# Patient Record
Sex: MALE | Race: WHITE | Employment: OTHER | URBAN - METROPOLITAN AREA
[De-identification: names, ages, dates, MRNs, and addresses within clinical notes are randomized per-mention and may not be internally consistent; named-entity substitution may affect disease eponyms.]

---

## 2018-04-13 ENCOUNTER — HOSPITAL ENCOUNTER (OUTPATIENT)
Age: 73
Discharge: HOME | End: 2018-04-13
Payer: MEDICARE

## 2018-04-13 ENCOUNTER — HOSPITAL ENCOUNTER (OUTPATIENT)
Age: 73
Discharge: HOME | End: 2018-04-15
Payer: MEDICARE

## 2018-04-13 ENCOUNTER — HOSPITAL ENCOUNTER (OUTPATIENT)
Dept: GENERAL RADIOLOGY | Age: 73
Discharge: HOME | End: 2018-04-15
Payer: MEDICARE

## 2018-04-13 DIAGNOSIS — R07.0 BURNING SENSATION OF THROAT: ICD-10-CM

## 2018-04-13 LAB
EKG ATRIAL RATE: 80 BPM
EKG P AXIS: 49 DEGREES
EKG P-R INTERVAL: 202 MS
EKG Q-T INTERVAL: 358 MS
EKG QRS DURATION: 90 MS
EKG QTC CALCULATION (BAZETT): 412 MS
EKG R AXIS: 42 DEGREES
EKG T AXIS: 46 DEGREES
EKG VENTRICULAR RATE: 80 BPM

## 2018-04-13 PROCEDURE — 71046 X-RAY EXAM CHEST 2 VIEWS: CPT

## 2018-04-13 PROCEDURE — 93005 ELECTROCARDIOGRAM TRACING: CPT | Performed by: FAMILY MEDICINE

## 2020-01-27 ENCOUNTER — HOSPITAL ENCOUNTER (OUTPATIENT)
Age: 75
Discharge: HOME | End: 2020-01-29
Payer: MEDICARE

## 2020-01-27 ENCOUNTER — HOSPITAL ENCOUNTER (OUTPATIENT)
Dept: GENERAL RADIOLOGY | Age: 75
Discharge: HOME | End: 2020-01-29
Payer: MEDICARE

## 2020-01-27 PROCEDURE — 74018 RADEX ABDOMEN 1 VIEW: CPT

## 2020-03-06 ENCOUNTER — OFFICE VISIT (OUTPATIENT)
Dept: SURGERY | Age: 75
End: 2020-03-06
Payer: MEDICARE

## 2020-03-06 VITALS
DIASTOLIC BLOOD PRESSURE: 64 MMHG | HEIGHT: 64 IN | BODY MASS INDEX: 31.07 KG/M2 | HEART RATE: 98 BPM | WEIGHT: 182 LBS | TEMPERATURE: 97.9 F | SYSTOLIC BLOOD PRESSURE: 125 MMHG

## 2020-03-06 PROCEDURE — G8417 CALC BMI ABV UP PARAM F/U: HCPCS | Performed by: SURGERY

## 2020-03-06 PROCEDURE — G8427 DOCREV CUR MEDS BY ELIG CLIN: HCPCS | Performed by: SURGERY

## 2020-03-06 PROCEDURE — 99204 OFFICE O/P NEW MOD 45 MIN: CPT | Performed by: SURGERY

## 2020-03-06 PROCEDURE — G8484 FLU IMMUNIZE NO ADMIN: HCPCS | Performed by: SURGERY

## 2020-03-06 RX ORDER — ALFUZOSIN HYDROCHLORIDE 10 MG/1
10 TABLET, EXTENDED RELEASE ORAL NIGHTLY
COMMUNITY
Start: 2020-02-10

## 2020-03-06 NOTE — PROGRESS NOTES
DISKUS) 100-50 MCG/DOSE diskus inhaler Inhale 1 puff into the lungs every 12 hours Yes Chung July, DO   ADVAIR DISKUS 100-50 MCG/DOSE diskus inhaler Inhale 1 puff into the lungs every 12 hours Yes Chung July, DO   rosuvastatin (CRESTOR) 5 MG tablet Take 5 mg by mouth daily Yes Historical Provider, MD   famotidine (PEPCID) 20 MG tablet Take 20 mg by mouth 2 times daily Yes Historical Provider, MD   finasteride (PROSCAR) 5 MG tablet Take 5 mg by mouth daily Yes Historical Provider, MD Matt Arrington Serenoa repens, 1000 MG CAPS Take 1,200 mg by mouth daily Yes Historical Provider, MD   albuterol sulfate HFA (PROAIR HFA) 108 (90 BASE) MCG/ACT inhaler Inhale 2 puffs into the lungs every 6 hours as needed for Wheezing Yes Archibald July, DO   losartan (COZAAR) 50 MG tablet Take 1 tablet by mouth daily. Yes Historical Provider, MD   sodium chloride (OCEAN, BABY AYR) 0.65 % nasal spray by Nasal route. Daily to rinse nasal passages Yes Historical Provider, MD   Multiple Vitamins-Minerals (MULTIVITAMIN PO) Take 1 tablet by mouth daily. Yes Historical Provider, MD   Ascorbic Acid (VITAMIN C PO) Take 500 mg by mouth. Yes Historical Provider, MD   Flax OIL Take 1,000 mg by mouth daily. Yes Historical Provider, MD   vitamin D (CHOLECALCIFEROL) 1000 UNIT TABS tablet Take 1,000 Units by mouth daily. Yes Historical Provider, MD   acetaminophen (TYLENOL) 500 MG tablet Take 500 mg by mouth every 6 hours as needed. Yes Historical Provider, MD   dextromethorphan (DELSYM) 30 MG/5ML liquid Take 60 mg by mouth 2 times daily as needed. Yes Historical Provider, MD   bismuth subsalicylate (PEPTO BISMOL) 262 MG chewable tablet Take  by mouth daily as needed. Yes Historical Provider, MD   amoxicillin (AMOXIL) 500 MG capsule Take 500 mg by mouth. Pre dental work take 4 pills 1 hour before appointment Yes Historical Provider, MD   levothyroxine (SYNTHROID) 50 MCG tablet Take 50 mcg by mouth daily.    Yes Historical Provider, MD   calcium

## 2020-04-03 ENCOUNTER — VIRTUAL VISIT (OUTPATIENT)
Dept: SURGERY | Age: 75
End: 2020-04-03
Payer: MEDICARE

## 2020-04-03 PROCEDURE — 99442 PR PHYS/QHP TELEPHONE EVALUATION 11-20 MIN: CPT | Performed by: SURGERY

## 2020-04-03 NOTE — PROGRESS NOTES
Maribel Singh Read  4/3/2020      Office phone visit  15 minutes    Sarah Angeles is a 69 y.o.  male with chronic diarrhea and constipation. Dr. Ismael Lowery did EGD and Colonoscopy. Treated with Flagyl and the diarrhea resolved for several weeks then came back. Using Probiotics. Used [place] daily to make the bowels move. Tried Miralax which caused too much diarrhea. Uses Pepto Bismol to slow down the diarrhea. Used Fiber gummy's which worked well unless he does too much and he gets gas and bloating. Bowels moving once a day but he still gets some diarrhea then constipation if he is not careful. Past Medical History: He has a past medical history of Acid reflux, Asthma, Blood transfusion, Cancer ([facility] [address] Cataract, Glaucoma, H/O splenectomy, Hyperlipidemia, Hypertension, IBS (irritable bowel syndrome), Myxoma of heart, Nausea & vomiting, Obesity, Rhinitis, Shingles, and Thyroid disease. Past Surgical History: He has a past surgical history that includes Inguinal hernia repair; Tonsillectomy; Appendectomy; Cholecystectomy (2001); Upper gastrointestinal endoscopy (several); Colonoscopy; eye surgery; Cardiac surgery (1996); skin biopsy; Abdomen surgery (2009); Abdomen surgery (1984); [facility] tooth extraction; Dental surgery; Hernia repair (11-); Splenectomy (1988); Hip fracture surgery (10/2012); ECHO Compl W Dop Color Flow (12/13/2012); Cholecystectomy; Colon surgery; Abdomen surgery (11/2011); and Abdominal adhesion surgery (12/01/2012). Home Medications  Prior to Visit Medications    Medication Sig Taking?  Authorizing Provider   ADVAIR DISKUS 100-50 MCG/DOSE diskus inhaler Inhale 1 puff into the lungs every 12 hours  Major Leslie DO   alfuzosin (UROXATRAL) 10 MG extended release tablet   Historical Provider, MD   montelukast (SINGULAIR) 10 MG tablet Take 1 tablet by mouth nightly  Major Leslie DO   fluticasone (FLONASE) 50 MCG/ACT nasal spray 1 spray by Nasal route daily  Leslie Hathaway DO   azelastine (ASTELIN) 0.1 % nasal spray 1 inhalation per nostril once daily  Major Leslie,    Amantadine (SYMMETREL) 100 MG TABS tablet 1 tab daily x 5 days cold/flu sx Dec-May  Major Leslie DO   beclomethasone (QVAR) 80 MCG/ACT inhaler Inhale 1 puff into the lungs 2 times daily Use for flare ups x 4-6 weeks  Major Leslie DO   ADVAIR DISKUS 100-50 MCG/DOSE diskus inhaler Inhale 1 puff into the lungs every 12 hours  Major Leslie DO   rosuvastatin (CRESTOR) 5 MG tablet Take 5 mg by mouth daily  Historical Provider, MD   famotidine (PEPCID) 20 MG tablet Take 20 mg by mouth 2 times daily  Historical Provider, MD   finasteride (PROSCAR) 5 MG tablet Take 5 mg by mouth daily  Historical Provider, MD   Saw [place], Serenoa repens, 1000 MG CAPS Take 1,200 mg by mouth daily  Historical Provider, MD   albuterol sulfate HFA (PROAIR HFA) 108 (90 BASE) MCG/ACT inhaler Inhale 2 puffs into the lungs every 6 hours as needed for Wheezing  Major Leslie DO   losartan (COZAAR) 50 MG tablet Take 1 tablet by mouth daily. Historical Provider, MD   sodium chloride (OCEAN, BABY AYR) 0.65 % nasal spray by Nasal route. Daily to rinse nasal passages  Historical Provider, MD   Multiple Vitamins-Minerals (MULTIVITAMIN PO) Take 1 tablet by mouth daily. Historical Provider, MD   Ascorbic Acid (VITAMIN C PO) Take 500 mg by mouth. Historical Provider, MD   Flax OIL Take 1,000 mg by mouth daily. Historical Provider, MD   vitamin D (CHOLECALCIFEROL) 1000 UNIT TABS tablet Take 1,000 Units by mouth daily. Historical Provider, MD   acetaminophen (TYLENOL) 500 MG tablet Take 500 mg by mouth every 6 hours as needed. Historical Provider, MD   dextromethorphan (DELSYM) 30 MG/5ML liquid Take 60 mg by mouth 2 times daily as needed. Historical Provider, MD   bismuth subsalicylate (PEPTO BISMOL) 262 MG chewable tablet Take  by mouth daily as needed. Historical Provider, MD   amoxicillin (AMOXIL) 500 MG capsule Take 500 mg by mouth.  Pre dental work take 4 pills 1 hour before appointment  Historical Provider, MD   levothyroxine (SYNTHROID) 50 MCG tablet Take 50 mcg by mouth daily. Historical Provider, MD   calcium carbonate (TUMS) 500 MG chewable tablet Take 1 tablet by mouth daily. 750 mg tums 1 or 2 after meals as needed   Historical Provider, MD   aspirin 81 MG tablet Take 81 mg by mouth daily. Historical Provider, MD       Allergies: Flomax [tamsulosin hcl]; Adhesive tape; Alphagan [brimonidine tartrate]; Brimonidine tartrate; Cortisone; Cosopt [dorzolamide hcl-timolol mal]; Cosopt [dorzolamide hcl-timolol mal]; Gluten; Mold extract [trichophyton mentagrophytes]; Other; Timolol; Timolol maleate; and Cortisone   Social History:   TOBACCO:   reports that he has never smoked. He has never used smokeless tobacco.  All smokers should join the free smoking cessation program and stop smoking before having surgery. ETOH:    reports current alcohol use. Problem Relation Age of Onset    Dementia Mother             Other Father         /broken heart after wife        Review of Systems:  Psychiatric:  depression and anxiety  Respiratory: negative  Cardiovascular: negative  Gastrointestinal: negative  Musculoskeletal:negative  All others reviewed, negative    Physical Exam:   VITALS: There were no vitals taken for this visit. ASSESSMENT: diarrhea and constipation will be a lifelong problem but controllable with fiber. PLAN: continue using Fiber Gummy's 2-6 per day with lots of fluids to make sure bowels move once a day, nice and soft. Ok to Topmission as needed. Signed: Dr. De Murdock M.D.     Send copy of consult to PCP, Eneida Ochoa DO

## 2021-02-26 ENCOUNTER — IMMUNIZATION (OUTPATIENT)
Dept: PRIMARY CARE CLINIC | Age: 76
End: 2021-02-26
Payer: MEDICARE

## 2021-02-26 PROCEDURE — 91300 COVID-19, PFIZER VACCINE 30MCG/0.3ML DOSE: CPT | Performed by: PHYSICIAN ASSISTANT

## 2021-02-26 PROCEDURE — 0001A COVID-19, PFIZER VACCINE 30MCG/0.3ML DOSE: CPT | Performed by: PHYSICIAN ASSISTANT

## 2021-03-22 ENCOUNTER — IMMUNIZATION (OUTPATIENT)
Dept: PRIMARY CARE CLINIC | Age: 76
End: 2021-03-22
Payer: MEDICARE

## 2021-03-22 PROCEDURE — 0002A PR IMM ADMN SARSCOV2 30MCG/0.3ML DIL RECON 2ND DOSE: CPT | Performed by: NURSE PRACTITIONER

## 2021-03-22 PROCEDURE — 91300 COVID-19, PFIZER VACCINE 30MCG/0.3ML DOSE: CPT | Performed by: NURSE PRACTITIONER

## 2023-06-23 ENCOUNTER — HOSPITAL ENCOUNTER (OUTPATIENT)
Dept: GENERAL RADIOLOGY | Age: 78
End: 2023-06-23
Payer: MEDICARE

## 2023-06-23 ENCOUNTER — HOSPITAL ENCOUNTER (OUTPATIENT)
Age: 78
End: 2023-06-23
Payer: MEDICARE

## 2023-06-23 DIAGNOSIS — M19.90 ARTHRITIS: ICD-10-CM

## 2023-06-23 DIAGNOSIS — M54.05 PANNICULITIS INVOLVING THORACOLUMBAR REGION: ICD-10-CM

## 2023-06-23 PROCEDURE — 72072 X-RAY EXAM THORAC SPINE 3VWS: CPT

## 2023-06-23 PROCEDURE — 72170 X-RAY EXAM OF PELVIS: CPT

## 2023-06-23 PROCEDURE — 72100 X-RAY EXAM L-S SPINE 2/3 VWS: CPT

## 2023-06-30 ENCOUNTER — OFFICE VISIT (OUTPATIENT)
Dept: NEUROSURGERY | Age: 78
End: 2023-06-30
Payer: MEDICARE

## 2023-06-30 VITALS
RESPIRATION RATE: 16 BRPM | SYSTOLIC BLOOD PRESSURE: 154 MMHG | DIASTOLIC BLOOD PRESSURE: 86 MMHG | HEART RATE: 93 BPM | WEIGHT: 139 LBS | HEIGHT: 62 IN | OXYGEN SATURATION: 94 % | BODY MASS INDEX: 25.58 KG/M2

## 2023-06-30 DIAGNOSIS — M54.50 CHRONIC BILATERAL LOW BACK PAIN WITHOUT SCIATICA: Primary | ICD-10-CM

## 2023-06-30 DIAGNOSIS — G89.29 CHRONIC BILATERAL LOW BACK PAIN WITHOUT SCIATICA: Primary | ICD-10-CM

## 2023-06-30 PROCEDURE — G8427 DOCREV CUR MEDS BY ELIG CLIN: HCPCS | Performed by: PHYSICIAN ASSISTANT

## 2023-06-30 PROCEDURE — 3077F SYST BP >= 140 MM HG: CPT | Performed by: PHYSICIAN ASSISTANT

## 2023-06-30 PROCEDURE — 99202 OFFICE O/P NEW SF 15 MIN: CPT

## 2023-06-30 PROCEDURE — 1036F TOBACCO NON-USER: CPT | Performed by: PHYSICIAN ASSISTANT

## 2023-06-30 PROCEDURE — 99203 OFFICE O/P NEW LOW 30 MIN: CPT | Performed by: PHYSICIAN ASSISTANT

## 2023-06-30 PROCEDURE — 1123F ACP DISCUSS/DSCN MKR DOCD: CPT | Performed by: PHYSICIAN ASSISTANT

## 2023-06-30 PROCEDURE — 3079F DIAST BP 80-89 MM HG: CPT | Performed by: PHYSICIAN ASSISTANT

## 2023-06-30 PROCEDURE — G8419 CALC BMI OUT NRM PARAM NOF/U: HCPCS | Performed by: PHYSICIAN ASSISTANT

## 2023-07-10 ENCOUNTER — HOSPITAL ENCOUNTER (EMERGENCY)
Age: 78
Discharge: HOME | End: 2023-07-10
Attending: EMERGENCY MEDICINE
Payer: MEDICARE

## 2023-07-10 VITALS
OXYGEN SATURATION: 96 % | HEART RATE: 99 BPM | RESPIRATION RATE: 18 BRPM | BODY MASS INDEX: 25.58 KG/M2 | SYSTOLIC BLOOD PRESSURE: 143 MMHG | TEMPERATURE: 98 F | HEIGHT: 62 IN | WEIGHT: 139 LBS | DIASTOLIC BLOOD PRESSURE: 69 MMHG

## 2023-07-10 DIAGNOSIS — M54.50 ACUTE RIGHT-SIDED LOW BACK PAIN WITHOUT SCIATICA: Primary | ICD-10-CM

## 2023-07-10 PROCEDURE — 99283 EMERGENCY DEPT VISIT LOW MDM: CPT

## 2023-07-10 PROCEDURE — 6370000000 HC RX 637 (ALT 250 FOR IP): Performed by: PHYSICIAN ASSISTANT

## 2023-07-10 RX ORDER — HYDROCODONE BITARTRATE AND ACETAMINOPHEN 5; 325 MG/1; MG/1
1 TABLET ORAL EVERY 6 HOURS PRN
Qty: 8 TABLET | Refills: 0 | Status: SHIPPED | OUTPATIENT
Start: 2023-07-10 | End: 2023-07-10 | Stop reason: SDUPTHER

## 2023-07-10 RX ORDER — HYDROCODONE BITARTRATE AND ACETAMINOPHEN 5; 325 MG/1; MG/1
1 TABLET ORAL ONCE
Status: COMPLETED | OUTPATIENT
Start: 2023-07-10 | End: 2023-07-10

## 2023-07-10 RX ORDER — HYDROCODONE BITARTRATE AND ACETAMINOPHEN 5; 325 MG/1; MG/1
1 TABLET ORAL EVERY 6 HOURS PRN
Qty: 8 TABLET | Refills: 0 | Status: SHIPPED | OUTPATIENT
Start: 2023-07-10 | End: 2023-07-12

## 2023-07-10 RX ADMIN — HYDROCODONE BITARTRATE AND ACETAMINOPHEN 1 TABLET: 5; 325 TABLET ORAL at 11:54

## 2023-07-10 ASSESSMENT — PAIN SCALES - GENERAL: PAINLEVEL_OUTOF10: 7

## 2023-07-10 NOTE — ED PROVIDER NOTES
there for approximately 6 months. He states he is PCP, who prescribed him Tylenol. He is not taking it without any relief of symptoms. He scheduled to have an x-ray and MRI done this week and follow-up with Dr. Guillermo Urban on Friday. Patient denies any changes in his bowel or bladder control, he denies dysuria, frequency or urgency. He denies any nausea or vomiting. No chest pain or shortness of breath. He denies any abdominal pain. Patient also complains of a hernia in the right inguinal area that started 6 weeks ago. He denies any difficulty with moving his bowels. He has not seen anybody for this. He denies fevers, chills or history of IV drug use. He has severe pain in the right hip and low back with any range of motion of the affected areas, nothing seems to make it better. Differential diagnosis includes but is not limited to cauda equina syndrome, epidural abscess, discitis, UTI, acute pyelonephritis, bowel obstruction, ischemic bowel, right hip arthritis, right hip strain, lumbar strain, sciatica. Patient denies any new fall or injury, imaging not done at this time. He has imaging scheduled for this week with a subsequent neurosurgery follow-up. He denies any loss of control of his bowels or bladder, saddle anesthesia, no sign cauda equina syndrome at this time. No fevers no chills. He denies any dysuria, frequency or urgency. He was complaining of hernia, denies any pain to the area, no vomiting, no difficulty with his bowels, is easily reduced and overlying erythema. Patient appears well otherwise. Patient is given a prescription for oral Michael Cook report is reviewed, no recent narcotics. Advised not to drive while she is on the medication. Encouraged to get his testing done about no surgery on Friday. Advised return to ER for any worsening symptoms.     Plan of Care/Counseling:  RIGO Esquivel reviewed today's visit with the patient in addition to providing specific details

## 2023-07-28 ENCOUNTER — OFFICE VISIT (OUTPATIENT)
Dept: NEUROSURGERY | Age: 78
End: 2023-07-28
Payer: MEDICARE

## 2023-07-28 VITALS — BODY MASS INDEX: 25.58 KG/M2 | WEIGHT: 139 LBS | RESPIRATION RATE: 16 BRPM | HEIGHT: 62 IN

## 2023-07-28 DIAGNOSIS — G89.29 CHRONIC BILATERAL LOW BACK PAIN WITHOUT SCIATICA: Primary | ICD-10-CM

## 2023-07-28 DIAGNOSIS — M54.50 CHRONIC BILATERAL LOW BACK PAIN WITHOUT SCIATICA: Primary | ICD-10-CM

## 2023-07-28 PROCEDURE — 99213 OFFICE O/P EST LOW 20 MIN: CPT | Performed by: PHYSICIAN ASSISTANT

## 2023-07-28 PROCEDURE — G8419 CALC BMI OUT NRM PARAM NOF/U: HCPCS | Performed by: PHYSICIAN ASSISTANT

## 2023-07-28 PROCEDURE — 99212 OFFICE O/P EST SF 10 MIN: CPT

## 2023-07-28 PROCEDURE — G8427 DOCREV CUR MEDS BY ELIG CLIN: HCPCS | Performed by: PHYSICIAN ASSISTANT

## 2023-07-28 PROCEDURE — 1123F ACP DISCUSS/DSCN MKR DOCD: CPT | Performed by: PHYSICIAN ASSISTANT

## 2023-07-28 PROCEDURE — 1036F TOBACCO NON-USER: CPT | Performed by: PHYSICIAN ASSISTANT

## 2023-07-28 NOTE — PROGRESS NOTES
Review Results     Patient presents without changes in previous subjective or objective information. Pleaser refer to initial documentation for additional information. Hip XR  IMPRESSION:  Intact right acetabular fixation hardware. Very severe degenerative  arthropathy at the right hip. MRI Lumbar Spine   IMPRESSION:  1. Acute or subacute compression fracture deformity of the T12 vertebral body  with approximately 50% loss of height. 2. Chronic L2 compression fracture deformity. 3. Moderate levoscoliosis. 4. Mild central canal stenosis at L4-5.  5. Grade 1 anterolisthesis of L4 over L5.  6.  Multilevel neural foraminal stenoses, worst (severe) at the left L4-5  level    Assessment:  Severe right hip osteoarthritis  Thoracolumbar scoliosis  Spondylolisthesis  Lumbar stenosis  Acute T12 compression fracture    Plan:   -Imaging reviewed with patient  -Discussed bracing vs T12 kyphoplasty, patient opting for the procedure  -Dr. Basil Springer has seen and evaluated the patient. All risks, benefits, and questions addressed.   -Medical and Cardio/Pulm clearance  -Patient to call when clearances completed for a surgery date

## 2023-08-14 ENCOUNTER — PREP FOR PROCEDURE (OUTPATIENT)
Dept: NEUROSURGERY | Age: 78
End: 2023-08-14

## 2023-08-14 ENCOUNTER — TELEPHONE (OUTPATIENT)
Dept: NEUROSURGERY | Age: 78
End: 2023-08-14

## 2023-08-14 PROBLEM — M48.54XA COMPRESSION FRACTURE OF THORACIC VERTEBRA, NON-TRAUMATIC (HCC): Status: ACTIVE | Noted: 2023-08-14

## 2023-08-14 RX ORDER — SODIUM CHLORIDE 0.9 % (FLUSH) 0.9 %
5-40 SYRINGE (ML) INJECTION EVERY 12 HOURS SCHEDULED
Status: CANCELLED | OUTPATIENT
Start: 2023-08-14

## 2023-08-14 RX ORDER — SODIUM CHLORIDE 9 MG/ML
INJECTION, SOLUTION INTRAVENOUS PRN
Status: CANCELLED | OUTPATIENT
Start: 2023-08-14

## 2023-08-14 RX ORDER — SODIUM CHLORIDE 9 MG/ML
INJECTION, SOLUTION INTRAVENOUS CONTINUOUS
Status: CANCELLED | OUTPATIENT
Start: 2023-08-14

## 2023-08-14 RX ORDER — SODIUM CHLORIDE 0.9 % (FLUSH) 0.9 %
5-40 SYRINGE (ML) INJECTION PRN
Status: CANCELLED | OUTPATIENT
Start: 2023-08-14

## 2023-08-14 NOTE — PROGRESS NOTES
[address]   PRE-ADMISSION TESTING GENERAL INSTRUCTIONS  [facility] Phone Number: [phone]      GENERAL INSTRUCTIONS:    [x] Antibacterial Soap Shower Night before and/or AM of surgery. [] CHG Wipes instruction sheet and wipes given. [] Hibiclens shower the night before and the morning of surgery.   -Do not use Hibiclens on your face or head. [x] Do not wear lotions, powders, deodorant. [x] Nothing by mouth after midnight; including gum, candy, mints, or water. [x] You may brush your teeth, gargle, but do NOT swallow water. [x] No tobacco products, illegal drugs, or alcohol within 24 hours of your surgery. [x] Jewelry or valuables should not be brought to the hospital. All body and/or tongue piercing's must be removed prior to arriving to hospital. No contact lens or hair pins. [x] Arrange transportation with a responsible adult  to and from the hospital. Arrange for someone to be with you for the remainder of the day and for 24 hours after your procedure due to having had anesthesia. -Who will be your  for transportation?___Ed_____         -Who will be staying with you for 24 hrs after your procedure?__________________  [x] Bring insurance card and photo ID.  [] Bring copy of living will or healthcare power of  papers to be placed in your electronic record. [] Urine Pregnancy test will be preformed the day of surgery. A specimen sample may be brought from home. [] Transfusion Blima Abide) Bracelet: Please bring with you to hospital, day of surgery. PARKING INSTRUCTIONS:     [x] ARRIVAL DATE & TIME:  8/17  @  3111  [x] Times are subject to change. We will contact you the business day before surgery if that were to occur. [x] Enter into the [employer]. Two people may accompany you. Masks are not required. [x] Parking Lot \"I\" is where you will park. It is located on the corner of [address] [place] [address] and [address] [place] [address].  The entrance is on photo ID and insurance card. A nurse will greet you in accordance to the time you are needed in the pre-op area to prepare you for surgery. Please do not be discouraged if you are not greeted in the order you arrive as there are many variables that are involved in patient preparation. Your patience is greatly appreciated as you wait for your nurse. Please bring in items such as: books, magazines, newspapers, electronics, or any other items  to occupy your time in the waiting area. [x]  Delays may occur with surgery and staff will make a sincere effort to keep you informed of delays. If any delays occur with your procedure, we apologize ahead of time for your inconvenience as we recognize the value of your time.

## 2023-08-14 NOTE — TELEPHONE ENCOUNTER
Prior Authorization Form:      DEMOGRAPHICS:                     Patient Name:  Lorene Kirby  Patient :  1945            Insurance:  Payor: MEDICARE / Plan: MEDICARE PART A AND B / Product Type: *No Product type* /   Insurance ID Number:    Payer/Plan Subscr  Sex Relation Sub. Ins. ID Effective Group Num   1. MEDICARE - ME* LEONARD KIRBY Settle 1945 Male Self [id number] 9/21/15                                    [address] [address]   2.  RETIREE MEDIC* Eliel Bustos  Male Self [id number] 20 [id number]                                   [address] [address]         DIAGNOSIS & PROCEDURE:                       Procedure/Operation: T12 Kyphoplasty           CPT Code: 08215    Diagnosis:  T12 compressio fracture    ICD10 Code: M48.54XA    Location:  main    Surgeon:  Ellie Brady INFORMATION:                          Date: 23    Time: follow              Anesthesia: [facility]                                                       Status:  Outpatient        Special Comments:  [employer]       Electronically signed by Sebas Ibarra MA on 2023 at 8:39 AM

## 2023-08-16 ENCOUNTER — ANESTHESIA EVENT (OUTPATIENT)
Dept: OPERATING ROOM | Age: 78
End: 2023-08-16
Payer: MEDICARE

## 2023-08-16 RX ORDER — PROCHLORPERAZINE EDISYLATE 5 MG/ML
5 INJECTION INTRAMUSCULAR; INTRAVENOUS
Status: CANCELLED | OUTPATIENT
Start: 2023-08-16 | End: 2023-08-17

## 2023-08-16 RX ORDER — DIPHENHYDRAMINE HYDROCHLORIDE 50 MG/ML
12.5 INJECTION INTRAMUSCULAR; INTRAVENOUS
Status: CANCELLED | OUTPATIENT
Start: 2023-08-16 | End: 2023-08-17

## 2023-08-16 RX ORDER — SODIUM CHLORIDE 0.9 % (FLUSH) 0.9 %
5-40 SYRINGE (ML) INJECTION PRN
Status: CANCELLED | OUTPATIENT
Start: 2023-08-16

## 2023-08-16 RX ORDER — OXYCODONE HYDROCHLORIDE 5 MG/1
5 TABLET ORAL
Status: CANCELLED | OUTPATIENT
Start: 2023-08-16 | End: 2023-08-17

## 2023-08-16 RX ORDER — HYDROMORPHONE HYDROCHLORIDE 1 MG/ML
0.5 INJECTION, SOLUTION INTRAMUSCULAR; INTRAVENOUS; SUBCUTANEOUS EVERY 5 MIN PRN
Status: CANCELLED | OUTPATIENT
Start: 2023-08-16

## 2023-08-16 RX ORDER — SODIUM CHLORIDE 0.9 % (FLUSH) 0.9 %
5-40 SYRINGE (ML) INJECTION EVERY 12 HOURS SCHEDULED
Status: CANCELLED | OUTPATIENT
Start: 2023-08-16

## 2023-08-16 RX ORDER — HYDRALAZINE HYDROCHLORIDE 20 MG/ML
10 INJECTION INTRAMUSCULAR; INTRAVENOUS
Status: CANCELLED | OUTPATIENT
Start: 2023-08-16

## 2023-08-16 RX ORDER — HYDROMORPHONE HYDROCHLORIDE 1 MG/ML
0.25 INJECTION, SOLUTION INTRAMUSCULAR; INTRAVENOUS; SUBCUTANEOUS EVERY 5 MIN PRN
Status: CANCELLED | OUTPATIENT
Start: 2023-08-16

## 2023-08-16 RX ORDER — HALOPERIDOL 5 MG/ML
1 INJECTION INTRAMUSCULAR
Status: CANCELLED | OUTPATIENT
Start: 2023-08-16 | End: 2023-08-17

## 2023-08-16 RX ORDER — IPRATROPIUM BROMIDE AND ALBUTEROL SULFATE 2.5; .5 MG/3ML; MG/3ML
1 SOLUTION RESPIRATORY (INHALATION)
Status: CANCELLED | OUTPATIENT
Start: 2023-08-16 | End: 2023-08-17

## 2023-08-16 RX ORDER — SODIUM CHLORIDE 9 MG/ML
INJECTION, SOLUTION INTRAVENOUS PRN
Status: CANCELLED | OUTPATIENT
Start: 2023-08-16

## 2023-08-17 ENCOUNTER — ANESTHESIA (OUTPATIENT)
Dept: OPERATING ROOM | Age: 78
End: 2023-08-17
Payer: MEDICARE

## 2023-08-17 ENCOUNTER — APPOINTMENT (OUTPATIENT)
Dept: GENERAL RADIOLOGY | Age: 78
End: 2023-08-17
Attending: NEUROLOGICAL SURGERY
Payer: MEDICARE

## 2023-08-17 ENCOUNTER — HOSPITAL ENCOUNTER (OUTPATIENT)
Age: 78
Setting detail: OUTPATIENT SURGERY
Discharge: HOME | End: 2023-08-17
Attending: NEUROLOGICAL SURGERY | Admitting: NEUROLOGICAL SURGERY
Payer: MEDICARE

## 2023-08-17 VITALS
HEIGHT: 62 IN | SYSTOLIC BLOOD PRESSURE: 129 MMHG | BODY MASS INDEX: 24.66 KG/M2 | TEMPERATURE: 97 F | OXYGEN SATURATION: 94 % | DIASTOLIC BLOOD PRESSURE: 58 MMHG | WEIGHT: 134 LBS | HEART RATE: 97 BPM | RESPIRATION RATE: 21 BRPM

## 2023-08-17 DIAGNOSIS — M48.54XA COMPRESSION FRACTURE OF THORACIC VERTEBRA, NON-TRAUMATIC (HCC): ICD-10-CM

## 2023-08-17 DIAGNOSIS — Z87.81 HX OF COMPRESSION FRACTURE OF SPINE: Primary | ICD-10-CM

## 2023-08-17 PROCEDURE — 6360000002 HC RX W HCPCS: Performed by: STUDENT IN AN ORGANIZED HEALTH CARE EDUCATION/TRAINING PROGRAM

## 2023-08-17 PROCEDURE — C1894 INTRO/SHEATH, NON-LASER: HCPCS | Performed by: NEUROLOGICAL SURGERY

## 2023-08-17 PROCEDURE — 88311 DECALCIFY TISSUE: CPT

## 2023-08-17 PROCEDURE — 7100000011 HC PHASE II RECOVERY - ADDTL 15 MIN: Performed by: NEUROLOGICAL SURGERY

## 2023-08-17 PROCEDURE — 6360000002 HC RX W HCPCS

## 2023-08-17 PROCEDURE — 3600000004 HC SURGERY LEVEL 4 BASE: Performed by: NEUROLOGICAL SURGERY

## 2023-08-17 PROCEDURE — 2580000003 HC RX 258: Performed by: STUDENT IN AN ORGANIZED HEALTH CARE EDUCATION/TRAINING PROGRAM

## 2023-08-17 PROCEDURE — 7100000010 HC PHASE II RECOVERY - FIRST 15 MIN: Performed by: NEUROLOGICAL SURGERY

## 2023-08-17 PROCEDURE — 6360000002 HC RX W HCPCS: Performed by: NEUROLOGICAL SURGERY

## 2023-08-17 PROCEDURE — 2720000010 HC SURG SUPPLY STERILE: Performed by: NEUROLOGICAL SURGERY

## 2023-08-17 PROCEDURE — C1713 ANCHOR/SCREW BN/BN,TIS/BN: HCPCS | Performed by: NEUROLOGICAL SURGERY

## 2023-08-17 PROCEDURE — 3700000000 HC ANESTHESIA ATTENDED CARE: Performed by: NEUROLOGICAL SURGERY

## 2023-08-17 PROCEDURE — 2500000003 HC RX 250 WO HCPCS: Performed by: NEUROLOGICAL SURGERY

## 2023-08-17 PROCEDURE — 3600000014 HC SURGERY LEVEL 4 ADDTL 15MIN: Performed by: NEUROLOGICAL SURGERY

## 2023-08-17 PROCEDURE — 3700000001 HC ADD 15 MINUTES (ANESTHESIA): Performed by: NEUROLOGICAL SURGERY

## 2023-08-17 PROCEDURE — 88307 TISSUE EXAM BY PATHOLOGIST: CPT

## 2023-08-17 PROCEDURE — 2709999900 HC NON-CHARGEABLE SUPPLY: Performed by: NEUROLOGICAL SURGERY

## 2023-08-17 DEVICE — CEMENT C01A KYPHX HV-R BONE CEMENT EN
Type: IMPLANTABLE DEVICE | Site: SPINE THORACIC | Status: FUNCTIONAL
Brand: KYPHON® HV-R® BONE CEMENT

## 2023-08-17 RX ORDER — FENTANYL CITRATE 50 UG/ML
INJECTION, SOLUTION INTRAMUSCULAR; INTRAVENOUS PRN
Status: DISCONTINUED | OUTPATIENT
Start: 2023-08-17 | End: 2023-08-17 | Stop reason: SDUPTHER

## 2023-08-17 RX ORDER — MIDAZOLAM HYDROCHLORIDE 1 MG/ML
INJECTION INTRAMUSCULAR; INTRAVENOUS PRN
Status: DISCONTINUED | OUTPATIENT
Start: 2023-08-17 | End: 2023-08-17 | Stop reason: SDUPTHER

## 2023-08-17 RX ORDER — PROPOFOL 10 MG/ML
INJECTION, EMULSION INTRAVENOUS PRN
Status: DISCONTINUED | OUTPATIENT
Start: 2023-08-17 | End: 2023-08-17 | Stop reason: SDUPTHER

## 2023-08-17 RX ORDER — SODIUM CHLORIDE 0.9 % (FLUSH) 0.9 %
5-40 SYRINGE (ML) INJECTION PRN
Status: DISCONTINUED | OUTPATIENT
Start: 2023-08-17 | End: 2023-08-17 | Stop reason: HOSPADM

## 2023-08-17 RX ORDER — SODIUM CHLORIDE 0.9 % (FLUSH) 0.9 %
5-40 SYRINGE (ML) INJECTION EVERY 12 HOURS SCHEDULED
Status: DISCONTINUED | OUTPATIENT
Start: 2023-08-17 | End: 2023-08-17 | Stop reason: HOSPADM

## 2023-08-17 RX ORDER — SODIUM CHLORIDE 9 MG/ML
INJECTION, SOLUTION INTRAVENOUS CONTINUOUS
Status: DISCONTINUED | OUTPATIENT
Start: 2023-08-17 | End: 2023-08-17 | Stop reason: HOSPADM

## 2023-08-17 RX ORDER — LIDOCAINE HYDROCHLORIDE AND EPINEPHRINE 10; 10 MG/ML; UG/ML
INJECTION, SOLUTION INFILTRATION; PERINEURAL PRN
Status: DISCONTINUED | OUTPATIENT
Start: 2023-08-17 | End: 2023-08-17 | Stop reason: ALTCHOICE

## 2023-08-17 RX ORDER — BUPIVACAINE HYDROCHLORIDE 2.5 MG/ML
INJECTION, SOLUTION EPIDURAL; INFILTRATION; INTRACAUDAL PRN
Status: DISCONTINUED | OUTPATIENT
Start: 2023-08-17 | End: 2023-08-17 | Stop reason: ALTCHOICE

## 2023-08-17 RX ORDER — SODIUM CHLORIDE 9 MG/ML
INJECTION, SOLUTION INTRAVENOUS PRN
Status: DISCONTINUED | OUTPATIENT
Start: 2023-08-17 | End: 2023-08-17 | Stop reason: HOSPADM

## 2023-08-17 RX ORDER — TRAMADOL HYDROCHLORIDE 50 MG/1
50 TABLET ORAL EVERY 4 HOURS PRN
Qty: 42 TABLET | Refills: 0 | Status: SHIPPED | OUTPATIENT
Start: 2023-08-17 | End: 2023-08-24

## 2023-08-17 RX ADMIN — SODIUM CHLORIDE: 9 INJECTION, SOLUTION INTRAVENOUS at 09:49

## 2023-08-17 RX ADMIN — FENTANYL CITRATE 50 MCG: 50 INJECTION, SOLUTION INTRAMUSCULAR; INTRAVENOUS at 11:16

## 2023-08-17 RX ADMIN — MIDAZOLAM 1 MG: 1 INJECTION INTRAMUSCULAR; INTRAVENOUS at 11:17

## 2023-08-17 RX ADMIN — CEFAZOLIN 2000 MG: 2 INJECTION, POWDER, FOR SOLUTION INTRAMUSCULAR; INTRAVENOUS at 11:30

## 2023-08-17 RX ADMIN — PROPOFOL 20 MG: 10 INJECTION, EMULSION INTRAVENOUS at 11:20

## 2023-08-17 RX ADMIN — MIDAZOLAM 1 MG: 1 INJECTION INTRAMUSCULAR; INTRAVENOUS at 11:16

## 2023-08-17 RX ADMIN — PROPOFOL 75 MCG/KG/MIN: 10 INJECTION, EMULSION INTRAVENOUS at 11:21

## 2023-08-17 ASSESSMENT — PAIN SCALES - GENERAL
PAINLEVEL_OUTOF10: 0
PAINLEVEL_OUTOF10: 0

## 2023-08-17 ASSESSMENT — PAIN - FUNCTIONAL ASSESSMENT: PAIN_FUNCTIONAL_ASSESSMENT: 0-10

## 2023-08-17 NOTE — PROGRESS NOTES
CLINICAL PHARMACY NOTE: MEDS TO BEDS    Total # of Prescriptions Filled: 1   The following medications were delivered to the patient:    Tramadol 50 mg      Additional Documentation:

## 2023-08-17 NOTE — H&P
[address] [place] [address] NEUROSURGERY      Patient: Smiley Cruz  :   MRN: [id number]     Date of Service: 2023     Reason for Referral: Back Pain      History of Present Illness: Patient is a 70 yo male, presenting for acute worsening of chronic back pain. States the pain is 8/10, constant, sharp/stabbing/aching. Patient is relieved while stretching and laying in the \"fetal position\". Denies recent associated trauma--does attests to a fall in  with a \"hip fracture\" and developed back pain at that point. States the pain radiates laterally and into the hips (denies radiation into the groin). No radicular pain, n/t, or weakness. States, \"my legs are good and strong\". No loss of bowel or bladder function. Denies recent PT or seeing a pain specialist for injections. Patient states he is only permitted to consume Tylenol per Dr. Jody Warren due to his multiple medical issues. 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    Hyperlipidemia      Hypertension      IBS (irritable bowel syndrome)      Myxoma of heart       denies h/o CAD    Nausea & vomiting      Obesity      Rhinitis      Shingles      Thyroid disease              Surgical History:  Past Surgical History             Procedure Laterality Date    ABDOMEN SURGERY        obstructive bowel, repair of hernia    ABDOMEN SURGERY        obstructive bowel    ABDOMEN SURGERY   2011     SBO due to abdominal adhesions requiring lysis and Mesh for rectus hernia repair    ABDOMINAL ADHESION SURGERY   2012     sbo    [address] [place] [address]     myxoma tumor in heart, x3    CHOLECYSTECTOMY       CHOLECYSTECTOMY        COLON SURGERY        COLONOSCOPY         several     DENTAL SURGERY         teeth upper removal    ECHO COMPL W DOP COLOR FLOW   2012          EYE SURGERY         cataract both eyes     HERNIA REPAIR   2011     open incisional hernia with mesh    HIP FRACTURE SURGERY   10/2012     open abdominal access, [place]    INGUINAL HERNIA REPAIR        SKIN BIOPSY         squamous cell carcinoma face    SPLENECTOMY   1988    TONSILLECTOMY        UPPER GASTROINTESTINAL ENDOSCOPY   several    WISDOM TOOTH EXTRACTION                Social History:   reports that he has never smoked. He has never been exposed to tobacco smoke. He has never used smokeless tobacco.   reports current alcohol use.      Family History:  Family History             Problem Relation Age of Onset    Dementia Mother               Other Father           /broken heart after wife             Review of Systems:  Denies fever, chills, or night sweats  Denies headache, dizziness, syncope  Denies blurred vision, double vision  Denies chest pain, palpitations, SOB  Denies diarrhea, constipation, n/v  Denies dysuria, hematuria  Denies recent infections  Denies easy bruising  Denies anxiety, depression     Physical Exam:  WDWN, resting comfortable, no apparent distress  Appears stated age  Vitals stable  Non-labored breathing   A&O x 3, normal affect   Head is normocephalic, atraumatic   No palpable lymphadenopathy   Abdomen soft, nontender  Pupils equal and reactive, no scleral icterus  EOMI bilaterally  Cranial nerves II-XII intact bilaterally  No drift  5/5 in BUE  5/5 in BLE  Sensation to LT intact x 4 ext  Toes going down  Skin warm and dry  +Jaylyn     Review of Imaging: None

## 2023-08-17 NOTE — ANESTHESIA PRE PROCEDURE
Department of Anesthesiology  Preprocedure Note       Name:  Nicanor Kaplan   Age:  69 y.o.  :  1945                                          MRN:  [id number]         Date:  2023      Surgeon: Lele Perez):  Luis Hutchison MD    Procedure: Procedure(s):  T12 KYPHOPLASTY/ c-arm x2, celia table, [employer]    Medications prior to admission:   Prior to Admission medications    Medication Sig Start Date End Date Taking? Authorizing Provider   albuterol sulfate HFA (PROAIR HFA) 108 (90 Base) MCG/ACT inhaler Inhale 2 puffs into the lungs every 6 hours as needed for Wheezing 23   Mary Jo Every, DO   fluticasone (FLONASE) 50 MCG/ACT nasal spray 1 spray by Each Nostril route 2 times daily 23   Mary Jo Every, DO   montelukast (SINGULAIR) 10 MG tablet Take 1 tablet by mouth nightly 22   Mary Jo Every, DO   fluticasone-salmeterol (ADVAIR) 100-50 MCG/DOSE diskus inhaler Inhale 1 puff into the lungs every 12 hours 3/15/22   Mary Jo Every, DO   alfuzosin (UROXATRAL) 10 MG extended release tablet 10 mg nightly With meals 2/10/20   Historical Provider, MD   Amantadine (SYMMETREL) 100 MG TABS tablet 1 tab daily x 5 days cold/flu sx Dec-May 9/10/19   Mary Jo Every, DO   beclomethasone (QVAR) 80 MCG/ACT inhaler Inhale 1 puff into the lungs 2 times daily Use for flare ups x 4-6 weeks 9/10/19   Mary Jo Every, DO   rosuvastatin (CRESTOR) 5 MG tablet Take 5 mg by mouth daily    Historical Provider, MD   famotidine (PEPCID) 20 MG tablet Take 10 mg by mouth 4 times daily     Historical Provider, MD   finasteride (PROSCAR) 5 MG tablet Take 5 mg by mouth daily    Historical Provider, MD   Saw [place], Serenoa repens, 1000 MG CAPS Take 1,200 mg by mouth daily    Historical Provider, MD   losartan (COZAAR) 50 MG tablet Take 1 tablet by mouth daily. 14   Historical Provider, MD   sodium chloride (OCEAN, BABY AYR) 0.65 % nasal spray by Nasal route.  Daily to rinse nasal passages    Historical Provider, MD   Multiple

## 2023-08-17 NOTE — DISCHARGE INSTRUCTIONS
Discharge Instructions:    1. No lifting more than 10 pounds. 2. Refrain from bending, twisting, or turning at the waist.   3. No brace is needed to be worn. 4. Can remove dressing and leave open to air one (1) day after surgery . 5. All stitches are under the skin and will dissolve in time. 6. Patient may shower. DO NOT soak or scrub at incision site. 7. Do not drive while taking narcotic pain medications. 8. Take medications as prescribed. Continue taking stool softener while taking narcotic pain medications. 9. Follow up in the Neurosurgery clinic in 4-6 weeks. No films necessary.

## 2023-08-17 NOTE — BRIEF OP NOTE
Brief Postoperative Note      Patient: Lazaro Marks  YOB: 1945  MRN: [id number]    Date of Procedure: 8/17/2023    Pre-Op Diagnosis Codes:     * Compression fracture of thoracic vertebra, non-traumatic (HCC) [E78.29AW]    Post-Op Diagnosis: Same       Procedure(s):  T12 KYPHOPLASTY    Surgeon(s):  Fernando Avelar MD    Assistant:  * No surgical staff found *    Anesthesia: Monitor Anesthesia Care    Estimated Blood Loss (mL): Minimal    Complications: None    Specimens:   ID Type Source Tests Collected by Time Destination   A : T12 BONE BIOPSY Tissue Spine SURGICAL PATHOLOGY Fernando Avelar MD 8/17/2023 1142        Implants:  Implant Name Type Inv.  Item Serial No.  Lot No. LRB No. Used Action   CEMENT BNE HI VISC RADIOPAQUE KYPHON HV-R - [id number]  CEMENT BNE HI VISC RADIOPAQUE KYPHON HV-R  [employer] [facility]- [id number] N/A 1 Implanted         Drains: * No LDAs found *    Findings: see dictated op note      Electronically signed by Corinne White MD on 8/17/2023 at 11:58 AM

## 2023-08-18 NOTE — OP NOTE
[address], [address] [place] [address]                                OPERATIVE REPORT    PATIENT NAME: Gabbi Faust                        :        1945  MED REC NO:   [id number]                            ROOM:  ACCOUNT NO:   [de-identified]                           ADMIT DATE: 2023  PROVIDER:     Massiel Morales MD    DATE OF PROCEDURE:  2023    PREOPERATIVE DIAGNOSIS:  Acute T12 osteoporotic compression fracture. POSTOPERATIVE DIAGNOSIS:  Acute T12 osteoporotic compression fracture. OPERATIVE PROCEDURES:  1.  Left-sided unilateral percutaneous transpedicular approach to T12  vertebral body for T12 vertebral body bone biopsy and T12 vertebral body  balloon kyphoplasty with use of Medtronic Kyphon balloon  polymethylmethacrylate. 2.  Use of biplanar fluoroscopy interpreted by myself, the surgeon. ANESTHESIA:  Monitored anesthesia care. SURGEON:  Massiel Morales MD    ASSISTANT:  None. COMPLICATIONS:  None. ESTIMATED BLOOD LOSS:  Minimal.    SPECIMENS:  Bone. OPERATIVE INDICATIONS:  The patient is a 81-year-old gentleman who  presented to the office complaining of excruciating back pain. He is  found to have an acute T12 compression fracture. He had failed  conservative therapy and after risks, benefits and alternatives were  discussed with the patient, it was determined that he would undergo the  above-listed procedure. DESCRIPTION OF PROCEDURE:  The patient was brought into the operating  room. A time-out was performed where he was identified by his name,  medical record number and the operative procedure, which he was about to  undergo. Next, induction of monitored anesthesia care was then  commenced. Upon completion of induction of monitored anesthesia care,  he received preoperative antibiotics. He was flipped in prone position  on a [employer] table. All pressure points were padded.   His thoracic  region was prepped and draped in the usual sterile fashion. After this  was done using biplanar fluoroscopy, I marked entry point to the T12  pedicle on the patient's left side. I infiltrated the skin with  lidocaine with epinephrine 1:200,000. I used a 15-blade to make a stab  incision. I docked [employer] [employer] Introducer on the facet,  advanced through the pedicle into the vertebral body. I was able to get  across midline. I removed the inner stylet, left the outer working  cannula in place. I inserted a bone biopsy tool. After obtaining a  biopsy, I inserted Medtronic Kyphon balloon that was inflated to 300  PSI. I deflated balloon, injected a total of 4.5 mL of  polymethylmethacrylate into the vertebral body. I then removed the  outer working cannula. I obtained a final AP and lateral fluoroscopic  images. There was no evidence of extravasation of cement. The skin was  closed with Dermabond. The patient was then flipped in supine position  on his hospital bed and transported to the postanesthesia care unit in  stable condition. There were no complications. Counts were correct. I  was present for the entire case.       Elly Clark MD    D: 08/17/2023 21:49:15       T: 08/17/2023 21:51:43     [email or web address]  Job#: [id number]     Doc#: [id number]    CC:

## 2023-08-21 ENCOUNTER — TELEPHONE (OUTPATIENT)
Dept: NEUROSURGERY | Age: 78
End: 2023-08-21

## 2023-08-21 NOTE — TELEPHONE ENCOUNTER
Patient had surgery on 8-17/23. He is having dizzy spells and still has pain. He is taking Tramadol 50 mg and Tylenol 500 mg. He wants to know when he can stop taking Tramadol?     [phone] phone

## 2023-08-21 NOTE — TELEPHONE ENCOUNTER
The Tramadol is as needed, so he can stop taking at any point if the pain is controlled.  He may continue Tylenol

## 2023-08-23 LAB — SURGICAL PATHOLOGY REPORT: NORMAL

## 2023-09-14 ENCOUNTER — OFFICE VISIT (OUTPATIENT)
Dept: NEUROSURGERY | Age: 78
End: 2023-09-14
Payer: MEDICARE

## 2023-09-14 VITALS
BODY MASS INDEX: 24.66 KG/M2 | DIASTOLIC BLOOD PRESSURE: 70 MMHG | HEART RATE: 77 BPM | HEIGHT: 62 IN | OXYGEN SATURATION: 96 % | WEIGHT: 134 LBS | SYSTOLIC BLOOD PRESSURE: 125 MMHG

## 2023-09-14 DIAGNOSIS — M48.54XS NONTRAUMATIC COMPRESSION FRACTURE OF T12 VERTEBRA, SEQUELA: Primary | ICD-10-CM

## 2023-09-14 DIAGNOSIS — Z98.890 STATUS POST KYPHOPLASTY: ICD-10-CM

## 2023-09-14 PROCEDURE — 99024 POSTOP FOLLOW-UP VISIT: CPT | Performed by: STUDENT IN AN ORGANIZED HEALTH CARE EDUCATION/TRAINING PROGRAM

## 2023-09-14 PROCEDURE — 99212 OFFICE O/P EST SF 10 MIN: CPT

## 2023-09-14 NOTE — PROGRESS NOTES
Post-Operative Follow-up     This is a 69year old male who presents to the office for a 1 month follow-up s/p T12 kyphoplasty. Subjective: Patient states he is doing well. He states he is still sore, but pain is improving. No new pain. No numbness or weakness. No other complaints. Physical Exam:              WDWN, no apparent distress              Non-labored breathing               Vitals Stable              Alert and oriented x3              CN 3-12 intact              PERRL              EOMI              ROY well              Motor strength symmetric              Sensation to LT intact bilaterally     Assessment: This is a 69 y.o.  male presenting for a 1 month follow-up s/p T12 kyphoplasty      Plan:  -Pain control and expectations discussed  -No restrictions   -OARRS report reviewed   -Follow-up in neurosurgery clinic prn  -Call or return to neurosurgery office sooner if symptoms worsen or if new issues arise in the interim.     Electronically signed by Rasheed Nogueira PA-C on 9/14/2023 at 5:10 PM

## 2023-10-09 ENCOUNTER — TELEPHONE (OUTPATIENT)
Dept: NEUROSURGERY | Age: 78
End: 2023-10-09

## 2023-10-09 NOTE — TELEPHONE ENCOUNTER
Patient called asking that NATANAEL Keane return his call so that he can discuss the brace a bit more with him as he is just not ready to have the office place that order yet. Thank you.

## 2023-10-09 NOTE — TELEPHONE ENCOUNTER
Patient called, all questions answered. No brace needed, can obtain one for comfort if he would like.  Offered PT for stenosis seen on MRI, patient declined at this time

## 2023-10-16 ENCOUNTER — TELEPHONE (OUTPATIENT)
Dept: NEUROSURGERY | Age: 78
End: 2023-10-16

## 2023-10-16 DIAGNOSIS — G89.29 CHRONIC BILATERAL LOW BACK PAIN WITHOUT SCIATICA: Primary | ICD-10-CM

## 2023-10-16 DIAGNOSIS — M54.50 CHRONIC BILATERAL LOW BACK PAIN WITHOUT SCIATICA: Primary | ICD-10-CM

## 2023-10-16 DIAGNOSIS — M43.10 ANTEROLISTHESIS: ICD-10-CM

## 2023-10-16 NOTE — TELEPHONE ENCOUNTER
Patient wants to talk to [facility] regarding pain he is having now after kyphoplasty and the changes he is having

## 2023-10-16 NOTE — TELEPHONE ENCOUNTER
Patient called, all questions answered. Patient with worsening low back pain, will send to [facility].

## 2023-10-20 ENCOUNTER — APPOINTMENT (OUTPATIENT)
Dept: RADIOLOGY | Facility: HOSPITAL | Age: 78
DRG: 552 | End: 2023-10-20
Payer: MEDICARE

## 2023-10-20 ENCOUNTER — HOSPITAL ENCOUNTER (INPATIENT)
Facility: HOSPITAL | Age: 78
LOS: 3 days | Discharge: NURSING, REHAB OR LONG-TERM CARE | DRG: 552 | End: 2023-10-25
Attending: STUDENT IN AN ORGANIZED HEALTH CARE EDUCATION/TRAINING PROGRAM | Admitting: INTERNAL MEDICINE
Payer: MEDICARE

## 2023-10-20 DIAGNOSIS — Z87.19 PERSONAL HISTORY OF OTHER DISEASES OF THE DIGESTIVE SYSTEM: ICD-10-CM

## 2023-10-20 DIAGNOSIS — M54.50 LOW BACK PAIN WITHOUT SCIATICA, UNSPECIFIED BACK PAIN LATERALITY, UNSPECIFIED CHRONICITY: Primary | ICD-10-CM

## 2023-10-20 PROBLEM — R26.9 GAIT DISTURBANCE: Status: ACTIVE | Noted: 2023-10-20

## 2023-10-20 LAB
ANION GAP SERPL CALC-SCNC: 10 MMOL/L (ref 10–20)
BASOPHILS # BLD AUTO: 0.03 X10*3/UL (ref 0–0.1)
BASOPHILS NFR BLD AUTO: 0.3 %
BUN SERPL-MCNC: 15 MG/DL (ref 6–23)
CALCIUM SERPL-MCNC: 9.9 MG/DL (ref 8.6–10.3)
CHLORIDE SERPL-SCNC: 94 MMOL/L (ref 98–107)
CO2 SERPL-SCNC: 30 MMOL/L (ref 21–32)
CREAT SERPL-MCNC: 0.73 MG/DL (ref 0.5–1.3)
CRP SERPL-MCNC: 2.26 MG/DL
EOSINOPHIL # BLD AUTO: 0 X10*3/UL (ref 0–0.4)
EOSINOPHIL NFR BLD AUTO: 0 %
ERYTHROCYTE [DISTWIDTH] IN BLOOD BY AUTOMATED COUNT: 13.9 % (ref 11.5–14.5)
ERYTHROCYTE [SEDIMENTATION RATE] IN BLOOD BY WESTERGREN METHOD: 58 MM/H (ref 0–20)
GFR SERPL CREATININE-BSD FRML MDRD: >90 ML/MIN/1.73M*2
GLUCOSE SERPL-MCNC: 88 MG/DL (ref 74–99)
HCT VFR BLD AUTO: 32.1 % (ref 41–52)
HGB BLD-MCNC: 10.7 G/DL (ref 13.5–17.5)
IMM GRANULOCYTES # BLD AUTO: 0.04 X10*3/UL (ref 0–0.5)
IMM GRANULOCYTES NFR BLD AUTO: 0.4 % (ref 0–0.9)
LYMPHOCYTES # BLD AUTO: 1.55 X10*3/UL (ref 0.8–3)
LYMPHOCYTES NFR BLD AUTO: 16.8 %
MCH RBC QN AUTO: 29.7 PG (ref 26–34)
MCHC RBC AUTO-ENTMCNC: 33.3 G/DL (ref 32–36)
MCV RBC AUTO: 89 FL (ref 80–100)
MONOCYTES # BLD AUTO: 1.04 X10*3/UL (ref 0.05–0.8)
MONOCYTES NFR BLD AUTO: 11.3 %
NEUTROPHILS # BLD AUTO: 6.54 X10*3/UL (ref 1.6–5.5)
NEUTROPHILS NFR BLD AUTO: 71.2 %
NRBC BLD-RTO: 0 /100 WBCS (ref 0–0)
PLATELET # BLD AUTO: 464 X10*3/UL (ref 150–450)
PMV BLD AUTO: 9.7 FL (ref 7.5–11.5)
POTASSIUM SERPL-SCNC: 3.9 MMOL/L (ref 3.5–5.3)
RBC # BLD AUTO: 3.6 X10*6/UL (ref 4.5–5.9)
SODIUM SERPL-SCNC: 130 MMOL/L (ref 136–145)
WBC # BLD AUTO: 9.2 X10*3/UL (ref 4.4–11.3)

## 2023-10-20 PROCEDURE — 93970 EXTREMITY STUDY: CPT

## 2023-10-20 PROCEDURE — 2500000001 HC RX 250 WO HCPCS SELF ADMINISTERED DRUGS (ALT 637 FOR MEDICARE OP): Performed by: STUDENT IN AN ORGANIZED HEALTH CARE EDUCATION/TRAINING PROGRAM

## 2023-10-20 PROCEDURE — G0378 HOSPITAL OBSERVATION PER HR: HCPCS

## 2023-10-20 PROCEDURE — 86140 C-REACTIVE PROTEIN: CPT | Performed by: STUDENT IN AN ORGANIZED HEALTH CARE EDUCATION/TRAINING PROGRAM

## 2023-10-20 PROCEDURE — 93971 EXTREMITY STUDY: CPT | Mod: FOREIGN READ | Performed by: RADIOLOGY

## 2023-10-20 PROCEDURE — 96375 TX/PRO/DX INJ NEW DRUG ADDON: CPT

## 2023-10-20 PROCEDURE — 80048 BASIC METABOLIC PNL TOTAL CA: CPT | Performed by: STUDENT IN AN ORGANIZED HEALTH CARE EDUCATION/TRAINING PROGRAM

## 2023-10-20 PROCEDURE — 2500000004 HC RX 250 GENERAL PHARMACY W/ HCPCS (ALT 636 FOR OP/ED): Performed by: STUDENT IN AN ORGANIZED HEALTH CARE EDUCATION/TRAINING PROGRAM

## 2023-10-20 PROCEDURE — 2500000005 HC RX 250 GENERAL PHARMACY W/O HCPCS: Performed by: STUDENT IN AN ORGANIZED HEALTH CARE EDUCATION/TRAINING PROGRAM

## 2023-10-20 PROCEDURE — 72131 CT LUMBAR SPINE W/O DYE: CPT | Mod: MG

## 2023-10-20 PROCEDURE — 96376 TX/PRO/DX INJ SAME DRUG ADON: CPT

## 2023-10-20 PROCEDURE — 85025 COMPLETE CBC W/AUTO DIFF WBC: CPT | Performed by: STUDENT IN AN ORGANIZED HEALTH CARE EDUCATION/TRAINING PROGRAM

## 2023-10-20 PROCEDURE — 99285 EMERGENCY DEPT VISIT HI MDM: CPT | Performed by: STUDENT IN AN ORGANIZED HEALTH CARE EDUCATION/TRAINING PROGRAM

## 2023-10-20 PROCEDURE — 96374 THER/PROPH/DIAG INJ IV PUSH: CPT

## 2023-10-20 PROCEDURE — 2500000001 HC RX 250 WO HCPCS SELF ADMINISTERED DRUGS (ALT 637 FOR MEDICARE OP): Performed by: INTERNAL MEDICINE

## 2023-10-20 PROCEDURE — 2500000004 HC RX 250 GENERAL PHARMACY W/ HCPCS (ALT 636 FOR OP/ED): Performed by: INTERNAL MEDICINE

## 2023-10-20 PROCEDURE — 99223 1ST HOSP IP/OBS HIGH 75: CPT | Performed by: INTERNAL MEDICINE

## 2023-10-20 PROCEDURE — 85652 RBC SED RATE AUTOMATED: CPT | Performed by: STUDENT IN AN ORGANIZED HEALTH CARE EDUCATION/TRAINING PROGRAM

## 2023-10-20 PROCEDURE — 72131 CT LUMBAR SPINE W/O DYE: CPT | Mod: FOREIGN READ | Performed by: RADIOLOGY

## 2023-10-20 PROCEDURE — 36415 COLL VENOUS BLD VENIPUNCTURE: CPT | Performed by: STUDENT IN AN ORGANIZED HEALTH CARE EDUCATION/TRAINING PROGRAM

## 2023-10-20 RX ORDER — MONTELUKAST SODIUM 10 MG/1
10 TABLET ORAL NIGHTLY
COMMUNITY

## 2023-10-20 RX ORDER — SODIUM CHLORIDE 9 MG/ML
75 INJECTION, SOLUTION INTRAVENOUS CONTINUOUS
Status: ACTIVE | OUTPATIENT
Start: 2023-10-20 | End: 2023-10-21

## 2023-10-20 RX ORDER — ACETAMINOPHEN 325 MG/1
650 TABLET ORAL EVERY 4 HOURS PRN
Status: DISCONTINUED | OUTPATIENT
Start: 2023-10-20 | End: 2023-10-25 | Stop reason: HOSPADM

## 2023-10-20 RX ORDER — MORPHINE SULFATE 4 MG/ML
4 INJECTION, SOLUTION INTRAMUSCULAR; INTRAVENOUS ONCE
Status: COMPLETED | OUTPATIENT
Start: 2023-10-20 | End: 2023-10-20

## 2023-10-20 RX ORDER — ASPIRIN 81 MG/1
81 TABLET ORAL DAILY
COMMUNITY

## 2023-10-20 RX ORDER — ACETAMINOPHEN 650 MG/1
650 SUPPOSITORY RECTAL EVERY 4 HOURS PRN
Status: DISCONTINUED | OUTPATIENT
Start: 2023-10-20 | End: 2023-10-25 | Stop reason: HOSPADM

## 2023-10-20 RX ORDER — OXYCODONE AND ACETAMINOPHEN 5; 325 MG/1; MG/1
1 TABLET ORAL ONCE
Status: COMPLETED | OUTPATIENT
Start: 2023-10-20 | End: 2023-10-20

## 2023-10-20 RX ORDER — LOSARTAN POTASSIUM 50 MG/1
50 TABLET ORAL DAILY
COMMUNITY

## 2023-10-20 RX ORDER — GABAPENTIN 100 MG/1
100 CAPSULE ORAL 2 TIMES DAILY
Status: DISCONTINUED | OUTPATIENT
Start: 2023-10-20 | End: 2023-10-25 | Stop reason: HOSPADM

## 2023-10-20 RX ORDER — LATANOPROST 50 UG/ML
1 SOLUTION/ DROPS OPHTHALMIC NIGHTLY
COMMUNITY

## 2023-10-20 RX ORDER — LEVOTHYROXINE SODIUM 75 UG/1
75 TABLET ORAL
COMMUNITY

## 2023-10-20 RX ORDER — KETOROLAC TROMETHAMINE 30 MG/ML
15 INJECTION, SOLUTION INTRAMUSCULAR; INTRAVENOUS ONCE
Status: COMPLETED | OUTPATIENT
Start: 2023-10-20 | End: 2023-10-20

## 2023-10-20 RX ORDER — FLUTICASONE PROPIONATE AND SALMETEROL 100; 50 UG/1; UG/1
1 POWDER RESPIRATORY (INHALATION) 2 TIMES DAILY
COMMUNITY

## 2023-10-20 RX ORDER — ACETAMINOPHEN 160 MG/5ML
650 SOLUTION ORAL EVERY 4 HOURS PRN
Status: DISCONTINUED | OUTPATIENT
Start: 2023-10-20 | End: 2023-10-25 | Stop reason: HOSPADM

## 2023-10-20 RX ORDER — LIDOCAINE 560 MG/1
1 PATCH PERCUTANEOUS; TOPICAL; TRANSDERMAL DAILY
Status: DISCONTINUED | OUTPATIENT
Start: 2023-10-20 | End: 2023-10-25 | Stop reason: HOSPADM

## 2023-10-20 RX ORDER — GUAIFENESIN/DEXTROMETHORPHAN 100-10MG/5
5 SYRUP ORAL EVERY 4 HOURS PRN
Status: DISCONTINUED | OUTPATIENT
Start: 2023-10-20 | End: 2023-10-25 | Stop reason: HOSPADM

## 2023-10-20 RX ORDER — ROSUVASTATIN CALCIUM 5 MG/1
5 TABLET, COATED ORAL NIGHTLY
COMMUNITY

## 2023-10-20 RX ADMIN — KETOROLAC TROMETHAMINE 15 MG: 30 INJECTION, SOLUTION INTRAMUSCULAR at 17:40

## 2023-10-20 RX ADMIN — OXYCODONE HYDROCHLORIDE AND ACETAMINOPHEN 1 TABLET: 5; 325 TABLET ORAL at 20:00

## 2023-10-20 RX ADMIN — GABAPENTIN 100 MG: 100 CAPSULE ORAL at 23:37

## 2023-10-20 RX ADMIN — MORPHINE SULFATE 4 MG: 4 INJECTION, SOLUTION INTRAMUSCULAR; INTRAVENOUS at 13:47

## 2023-10-20 RX ADMIN — MORPHINE SULFATE 4 MG: 4 INJECTION, SOLUTION INTRAMUSCULAR; INTRAVENOUS at 20:00

## 2023-10-20 RX ADMIN — MORPHINE SULFATE 4 MG: 4 INJECTION, SOLUTION INTRAMUSCULAR; INTRAVENOUS at 17:40

## 2023-10-20 RX ADMIN — SODIUM CHLORIDE 75 ML/HR: 9 INJECTION, SOLUTION INTRAVENOUS at 23:37

## 2023-10-20 RX ADMIN — LIDOCAINE 1 PATCH: 4 PATCH TOPICAL at 17:40

## 2023-10-20 SDOH — SOCIAL STABILITY: SOCIAL INSECURITY: WERE YOU ABLE TO COMPLETE ALL THE BEHAVIORAL HEALTH SCREENINGS?: YES

## 2023-10-20 SDOH — SOCIAL STABILITY: SOCIAL INSECURITY: HAVE YOU HAD THOUGHTS OF HARMING ANYONE ELSE?: NO

## 2023-10-20 SDOH — SOCIAL STABILITY: SOCIAL INSECURITY: ABUSE: ADULT

## 2023-10-20 ASSESSMENT — COGNITIVE AND FUNCTIONAL STATUS - GENERAL
MOBILITY SCORE: 24
DAILY ACTIVITIY SCORE: 24
PATIENT BASELINE BEDBOUND: NO

## 2023-10-20 ASSESSMENT — PAIN DESCRIPTION - PAIN TYPE: TYPE: ACUTE PAIN;CHRONIC PAIN

## 2023-10-20 ASSESSMENT — ENCOUNTER SYMPTOMS
VOMITING: 0
DIARRHEA: 1
ABDOMINAL PAIN: 0
FLANK PAIN: 0
DYSURIA: 0
FATIGUE: 0
NECK PAIN: 0
WEAKNESS: 1
PALPITATIONS: 0
CHILLS: 0
NAUSEA: 0
NUMBNESS: 0
FEVER: 0
SEIZURES: 0
CHEST TIGHTNESS: 0
BLOOD IN STOOL: 0
SHORTNESS OF BREATH: 0
CONFUSION: 0
DIZZINESS: 1
SPEECH DIFFICULTY: 0
EYES NEGATIVE: 1
SORE THROAT: 0
POLYDIPSIA: 0
BACK PAIN: 0
COUGH: 0
WHEEZING: 0

## 2023-10-20 ASSESSMENT — ACTIVITIES OF DAILY LIVING (ADL)
DRESSING YOURSELF: INDEPENDENT
TOILETING: INDEPENDENT
HEARING - RIGHT EAR: FUNCTIONAL
GROOMING: INDEPENDENT
LACK_OF_TRANSPORTATION: NO
WALKS IN HOME: INDEPENDENT
BATHING: INDEPENDENT
JUDGMENT_ADEQUATE_SAFELY_COMPLETE_DAILY_ACTIVITIES: YES
FEEDING YOURSELF: INDEPENDENT
HEARING - LEFT EAR: FUNCTIONAL
ADEQUATE_TO_COMPLETE_ADL: YES
PATIENT'S MEMORY ADEQUATE TO SAFELY COMPLETE DAILY ACTIVITIES?: YES

## 2023-10-20 ASSESSMENT — LIFESTYLE VARIABLES
PRESCIPTION_ABUSE_PAST_12_MONTHS: NO
HAVE YOU EVER FELT YOU SHOULD CUT DOWN ON YOUR DRINKING: NO
HOW MANY STANDARD DRINKS CONTAINING ALCOHOL DO YOU HAVE ON A TYPICAL DAY: PATIENT DOES NOT DRINK
EVER FELT BAD OR GUILTY ABOUT YOUR DRINKING: NO
HOW OFTEN DO YOU HAVE A DRINK CONTAINING ALCOHOL: NEVER
REASON UNABLE TO ASSESS: NO
SUBSTANCE_ABUSE_PAST_12_MONTHS: NO
AUDIT-C TOTAL SCORE: 0
HOW OFTEN DO YOU HAVE 6 OR MORE DRINKS ON ONE OCCASION: NEVER
SKIP TO QUESTIONS 9-10: 1
HAVE PEOPLE ANNOYED YOU BY CRITICIZING YOUR DRINKING: NO
AUDIT-C TOTAL SCORE: 0
EVER HAD A DRINK FIRST THING IN THE MORNING TO STEADY YOUR NERVES TO GET RID OF A HANGOVER: NO

## 2023-10-20 ASSESSMENT — PAIN SCALES - GENERAL
PAINLEVEL_OUTOF10: 9
PAINLEVEL_OUTOF10: 9
PAINLEVEL_OUTOF10: 3
PAINLEVEL_OUTOF10: 1
PAINLEVEL_OUTOF10: 1
PAINLEVEL_OUTOF10: 2
PAINLEVEL_OUTOF10: 1
PAINLEVEL_OUTOF10: 2
PAINLEVEL_OUTOF10: 3

## 2023-10-20 ASSESSMENT — COLUMBIA-SUICIDE SEVERITY RATING SCALE - C-SSRS
2. HAVE YOU ACTUALLY HAD ANY THOUGHTS OF KILLING YOURSELF?: NO
1. IN THE PAST MONTH, HAVE YOU WISHED YOU WERE DEAD OR WISHED YOU COULD GO TO SLEEP AND NOT WAKE UP?: NO
6. HAVE YOU EVER DONE ANYTHING, STARTED TO DO ANYTHING, OR PREPARED TO DO ANYTHING TO END YOUR LIFE?: NO

## 2023-10-20 ASSESSMENT — PATIENT HEALTH QUESTIONNAIRE - PHQ9
1. LITTLE INTEREST OR PLEASURE IN DOING THINGS: NOT AT ALL
2. FEELING DOWN, DEPRESSED OR HOPELESS: NOT AT ALL
SUM OF ALL RESPONSES TO PHQ9 QUESTIONS 1 & 2: 0

## 2023-10-20 ASSESSMENT — PAIN - FUNCTIONAL ASSESSMENT
PAIN_FUNCTIONAL_ASSESSMENT: 0-10

## 2023-10-20 ASSESSMENT — PAIN DESCRIPTION - PROGRESSION
CLINICAL_PROGRESSION: GRADUALLY WORSENING
CLINICAL_PROGRESSION: GRADUALLY WORSENING
CLINICAL_PROGRESSION: GRADUALLY IMPROVING

## 2023-10-20 ASSESSMENT — PAIN DESCRIPTION - ONSET: ONSET: ONGOING

## 2023-10-20 ASSESSMENT — PAIN DESCRIPTION - ORIENTATION: ORIENTATION: RIGHT;LEFT;LOWER;MID

## 2023-10-20 ASSESSMENT — PAIN DESCRIPTION - LOCATION: LOCATION: BACK

## 2023-10-20 ASSESSMENT — PAIN DESCRIPTION - DESCRIPTORS: DESCRIPTORS: ACHING;SHARP

## 2023-10-20 ASSESSMENT — PAIN DESCRIPTION - FREQUENCY: FREQUENCY: CONSTANT/CONTINUOUS

## 2023-10-20 NOTE — ED NOTES
Pt was able to walk from ER bed to hallway approx 16-20 ft total, w/ walker, slight/moderate assistance. Pain did not increase after getting back into bed. Provider notified.     Gilbert Yanez RN  10/20/23 1930

## 2023-10-20 NOTE — ED TRIAGE NOTES
Pt arriving from home via EMS, pt reports unable to get up today due to severe chronic lower and mid back pain that has been worsening more than 2 wk.

## 2023-10-20 NOTE — ED NOTES
ED tech attempted to get pt to ambulate, but pt could not even sit up due to severe pain. Provider notified.     Gilbert Yanez RN  10/20/23 3553

## 2023-10-20 NOTE — ED PROVIDER NOTES
HPI   Chief Complaint   Patient presents with    Back Pain     Pt arriving from home via EMS, pt reports unable to get up today due to severe chronic lower and mid back pain that has been worsening more than 2 wk.       This is a 78-year-old male past medical history of scoliosis, back surgery at T12 who presents to the emergency department for worsening back pain over the last 2 days.  Patient states that he intermittently gets back pain.  He states it is so bad that he is having difficulty walking secondary to the pain.  Denies any bowel or bladder issues that are new.  No numbness in the saddle region.  Denies any fevers or chills.  Denies any falls.    Patient also states that he has had worsening swelling in his both legs recently.                          [place] Coma Scale Score: 15                  Patient History   Past Medical History:   Diagnosis Date    Asthma     Cholecystitis     Chronic back pain     Personal history of other diseases of the circulatory system 07/22/2021    History of hypertension    Personal history of other diseases of the digestive system     History of diverticulosis     Past Surgical History:   Procedure Laterality Date    ADENOIDECTOMY  12/07/2016    Adenoidectomy    CATARACT EXTRACTION  12/07/2016    Cataract Surgery    CHOLECYSTECTOMY  12/07/2016    Cholecystectomy    HERNIA REPAIR  12/07/2016    Hernia Repair    OTHER SURGICAL HISTORY  12/07/2016    Surgical Cardiac Tumor Removal    OTHER SURGICAL HISTORY  12/07/2016    Surgical Lysis Of Intestinal Adhesions    OTHER SURGICAL HISTORY  12/07/2016    Oral Surgery    SPLENECTOMY, TOTAL  12/07/2016    Splenectomy    TONSILLECTOMY  12/07/2016    Tonsillectomy     No family history on file.  Social History     Tobacco Use    Smoking status: Never    Smokeless tobacco: Never   Vaping Use    Vaping Use: Never used   Substance Use Topics    Alcohol use: Not Currently    Drug use: Never       Physical Exam   ED Triage Vitals [10/20/23  1245]   Temp Heart Rate Resp BP   36.9 °C (98.4 °F) 93 18 139/80      SpO2 Temp Source Heart Rate Source Patient Position   95 % Temporal Monitor Lying      BP Location FiO2 (%)     Left arm --       Physical Exam  Constitutional:       General: He is not in acute distress.  HENT:      Head: Normocephalic and atraumatic.      Right Ear: Tympanic membrane normal.      Left Ear: Tympanic membrane normal.      Mouth/Throat:      Mouth: Mucous membranes are moist.   Eyes:      Extraocular Movements: Extraocular movements intact.      Conjunctiva/sclera: Conjunctivae normal.      Pupils: Pupils are equal, round, and reactive to light.   Cardiovascular:      Rate and Rhythm: Normal rate and regular rhythm.      Heart sounds: No murmur heard.  Pulmonary:      Effort: Pulmonary effort is normal. No respiratory distress.      Breath sounds: Normal breath sounds. No stridor. No wheezing or rales.   Abdominal:      General: Bowel sounds are normal. There is no distension.      Tenderness: There is no abdominal tenderness. There is no guarding or rebound.   Musculoskeletal:         General: No swelling, tenderness or deformity. Normal range of motion.      Comments: Patient has full range of motion of his lower extremities.  According to him he does have swelling.  He has 2+ pulses on his bilateral legs.  Sensations intact on his extremities.    Upon ambulation patient reports weakness secondary to pain   Skin:     General: Skin is warm and dry.      Coloration: Skin is not jaundiced.      Findings: No bruising or lesion.   Neurological:      General: No focal deficit present.      Mental Status: He is alert and oriented to person, place, and time. Mental status is at baseline.      Cranial Nerves: No cranial nerve deficit.      Motor: No weakness.   Psychiatric:         Mood and Affect: Mood normal.       Labs Reviewed   CBC WITH AUTO DIFFERENTIAL - Abnormal       Result Value    WBC 9.2      nRBC 0.0      RBC 3.60 (*)      Hemoglobin 10.7 (*)     Hematocrit 32.1 (*)     MCV 89      MCH 29.7      MCHC 33.3      RDW 13.9      Platelets 464 (*)     MPV 9.7      Neutrophils % 71.2      Immature Granulocytes %, Automated 0.4      Lymphocytes % 16.8      Monocytes % 11.3      Eosinophils % 0.0      Basophils % 0.3      Neutrophils Absolute 6.54 (*)     Immature Granulocytes Absolute, Automated 0.04      Lymphocytes Absolute 1.55      Monocytes Absolute 1.04 (*)     Eosinophils Absolute 0.00      Basophils Absolute 0.03     BASIC METABOLIC PANEL - Abnormal    Glucose 88      Sodium 130 (*)     Potassium 3.9      Chloride 94 (*)     Bicarbonate 30      Anion Gap 10      Urea Nitrogen 15      Creatinine 0.73      eGFR >90      Calcium 9.9     SEDIMENTATION RATE, AUTOMATED - Abnormal    Sedimentation Rate 58 (*)    C-REACTIVE PROTEIN - Abnormal    C-Reactive Protein 2.26 (*)      CT lumbar spine wo IV contrast   Final Result   Significant osteopenia.  Chronic compression deformities of T12, L1   and L3. 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 Significant L2-3 and L4-5 spinal stenosis related to disc   bulging, ligamentous hypertrophy and facet arthropathy. Left L2 neural   foraminal narrowing related to vertebral body hypertrophy and facet   arthropathy.   Signed by Gilbert Walton MD      Vascular US lower extremity venous duplex bilateral   Final Result   No evidence for DVT within the bilateral lower extremities.   Signed by Spencer Sales MD          ED Course & [facility]   ED Course as of 10/20/23 2205   Fri Oct 20, 2023   1454 IMPRESSION:  No evidence for DVT within the bilateral lower extremities.  Signed by Spencer Sales MD   [CF]   7273 Significant osteopenia.  Chronic compression deformities of T12, L1  and L3. 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 Significant L2-3 and L4-5 spinal stenosis related to disc  bulging, ligamentous hypertrophy and facet arthropathy. Left L2  neural  foraminal narrowing related to vertebral body hypertrophy and facet  arthropathy.   [CF]      ED Course User Index  [CF] Marleen Ellison MD       Medical Decision Making  78-year-old male with chronic back pain who presents emerged department for worsening pain.  Patient has no red flag symptoms, no issues with urination, bowel movements, no fevers and he has no weakness on my exam when laying down.  I have a low suspicion for cauda equina or spinal epidural abscess.  His sed rate is only 58 and his CRP is 2.  I did speak with patient again an MRI but he states that he does not want any surgical intervention even if he did have cauda equina.  DVT scan of bilateral legs showed no signs of blood clot.  Of low sufficient for arterial involvement given he has good pulses on bilateral extremities.  CT does show old compression fractures and L2 neural foraminal narrowing.  Patient was given Toradol, oxycodone, lidocaine patch, and a total of 12 of morphine and continues to have difficulty ambulating.  States he does live alone at this time will admit patient for pain control for his back pain        Procedure  Procedures     Marlene Ellison MD  10/20/23 1451

## 2023-10-21 ENCOUNTER — APPOINTMENT (OUTPATIENT)
Dept: RADIOLOGY | Facility: HOSPITAL | Age: 78
DRG: 552 | End: 2023-10-21
Payer: MEDICARE

## 2023-10-21 LAB
ANION GAP SERPL CALC-SCNC: 8 MMOL/L (ref 10–20)
APPEARANCE UR: CLEAR
BILIRUB UR STRIP.AUTO-MCNC: NEGATIVE MG/DL
BUN SERPL-MCNC: 17 MG/DL (ref 6–23)
CALCIUM SERPL-MCNC: 8.8 MG/DL (ref 8.6–10.3)
CHLORIDE SERPL-SCNC: 97 MMOL/L (ref 98–107)
CO2 SERPL-SCNC: 30 MMOL/L (ref 21–32)
COLOR UR: YELLOW
CREAT SERPL-MCNC: 0.85 MG/DL (ref 0.5–1.3)
ERYTHROCYTE [DISTWIDTH] IN BLOOD BY AUTOMATED COUNT: 13.8 % (ref 11.5–14.5)
GFR SERPL CREATININE-BSD FRML MDRD: 89 ML/MIN/1.73M*2
GLUCOSE SERPL-MCNC: 92 MG/DL (ref 74–99)
GLUCOSE UR STRIP.AUTO-MCNC: NEGATIVE MG/DL
HCT VFR BLD AUTO: 28.3 % (ref 41–52)
HGB BLD-MCNC: 9.3 G/DL (ref 13.5–17.5)
KETONES UR STRIP.AUTO-MCNC: ABNORMAL MG/DL
LEUKOCYTE ESTERASE UR QL STRIP.AUTO: NEGATIVE
MCH RBC QN AUTO: 29.4 PG (ref 26–34)
MCHC RBC AUTO-ENTMCNC: 32.9 G/DL (ref 32–36)
MCV RBC AUTO: 90 FL (ref 80–100)
NITRITE UR QL STRIP.AUTO: NEGATIVE
NRBC BLD-RTO: 0 /100 WBCS (ref 0–0)
PH UR STRIP.AUTO: 6 [PH]
PLATELET # BLD AUTO: 454 X10*3/UL (ref 150–450)
PMV BLD AUTO: 10 FL (ref 7.5–11.5)
POTASSIUM SERPL-SCNC: 4.3 MMOL/L (ref 3.5–5.3)
PROT UR STRIP.AUTO-MCNC: NEGATIVE MG/DL
RBC # BLD AUTO: 3.16 X10*6/UL (ref 4.5–5.9)
RBC # UR STRIP.AUTO: NEGATIVE /UL
SODIUM SERPL-SCNC: 131 MMOL/L (ref 136–145)
SP GR UR STRIP.AUTO: 1.01
UROBILINOGEN UR STRIP.AUTO-MCNC: <2 MG/DL
WBC # BLD AUTO: 7.3 X10*3/UL (ref 4.4–11.3)

## 2023-10-21 PROCEDURE — 99233 SBSQ HOSP IP/OBS HIGH 50: CPT | Performed by: INTERNAL MEDICINE

## 2023-10-21 PROCEDURE — 2500000005 HC RX 250 GENERAL PHARMACY W/O HCPCS: Performed by: STUDENT IN AN ORGANIZED HEALTH CARE EDUCATION/TRAINING PROGRAM

## 2023-10-21 PROCEDURE — 36415 COLL VENOUS BLD VENIPUNCTURE: CPT | Performed by: INTERNAL MEDICINE

## 2023-10-21 PROCEDURE — 85027 COMPLETE CBC AUTOMATED: CPT | Performed by: INTERNAL MEDICINE

## 2023-10-21 PROCEDURE — 94640 AIRWAY INHALATION TREATMENT: CPT

## 2023-10-21 PROCEDURE — 72148 MRI LUMBAR SPINE W/O DYE: CPT | Mod: MG

## 2023-10-21 PROCEDURE — 2500000001 HC RX 250 WO HCPCS SELF ADMINISTERED DRUGS (ALT 637 FOR MEDICARE OP): Performed by: INTERNAL MEDICINE

## 2023-10-21 PROCEDURE — 80048 BASIC METABOLIC PNL TOTAL CA: CPT | Performed by: INTERNAL MEDICINE

## 2023-10-21 PROCEDURE — 97165 OT EVAL LOW COMPLEX 30 MIN: CPT | Mod: GO | Performed by: OCCUPATIONAL THERAPIST

## 2023-10-21 PROCEDURE — 2500000004 HC RX 250 GENERAL PHARMACY W/ HCPCS (ALT 636 FOR OP/ED): Performed by: INTERNAL MEDICINE

## 2023-10-21 PROCEDURE — G0378 HOSPITAL OBSERVATION PER HR: HCPCS

## 2023-10-21 PROCEDURE — 72148 MRI LUMBAR SPINE W/O DYE: CPT | Performed by: RADIOLOGY

## 2023-10-21 PROCEDURE — 2500000002 HC RX 250 W HCPCS SELF ADMINISTERED DRUGS (ALT 637 FOR MEDICARE OP, ALT 636 FOR OP/ED): Performed by: INTERNAL MEDICINE

## 2023-10-21 PROCEDURE — 97161 PT EVAL LOW COMPLEX 20 MIN: CPT | Mod: GP

## 2023-10-21 PROCEDURE — 81003 URINALYSIS AUTO W/O SCOPE: CPT | Performed by: INTERNAL MEDICINE

## 2023-10-21 RX ORDER — ASPIRIN 81 MG/1
81 TABLET ORAL DAILY
Status: DISCONTINUED | OUTPATIENT
Start: 2023-10-21 | End: 2023-10-25 | Stop reason: HOSPADM

## 2023-10-21 RX ORDER — LEVOTHYROXINE SODIUM 75 UG/1
75 TABLET ORAL
Status: DISCONTINUED | OUTPATIENT
Start: 2023-10-22 | End: 2023-10-25 | Stop reason: HOSPADM

## 2023-10-21 RX ORDER — ROSUVASTATIN CALCIUM 10 MG/1
5 TABLET, COATED ORAL NIGHTLY
Status: DISCONTINUED | OUTPATIENT
Start: 2023-10-21 | End: 2023-10-25 | Stop reason: HOSPADM

## 2023-10-21 RX ORDER — FLUTICASONE FUROATE AND VILANTEROL 100; 25 UG/1; UG/1
1 POWDER RESPIRATORY (INHALATION)
Status: DISCONTINUED | OUTPATIENT
Start: 2023-10-21 | End: 2023-10-25 | Stop reason: HOSPADM

## 2023-10-21 RX ORDER — MONTELUKAST SODIUM 10 MG/1
10 TABLET ORAL NIGHTLY
Status: DISCONTINUED | OUTPATIENT
Start: 2023-10-21 | End: 2023-10-25 | Stop reason: HOSPADM

## 2023-10-21 RX ORDER — KETOROLAC TROMETHAMINE 30 MG/ML
15 INJECTION, SOLUTION INTRAMUSCULAR; INTRAVENOUS EVERY 6 HOURS PRN
Status: DISPENSED | OUTPATIENT
Start: 2023-10-21 | End: 2023-10-22

## 2023-10-21 RX ORDER — LOSARTAN POTASSIUM 50 MG/1
50 TABLET ORAL DAILY
Status: DISCONTINUED | OUTPATIENT
Start: 2023-10-21 | End: 2023-10-25 | Stop reason: HOSPADM

## 2023-10-21 RX ORDER — LATANOPROST 50 UG/ML
1 SOLUTION/ DROPS OPHTHALMIC NIGHTLY
Status: DISCONTINUED | OUTPATIENT
Start: 2023-10-21 | End: 2023-10-25 | Stop reason: HOSPADM

## 2023-10-21 RX ADMIN — GABAPENTIN 100 MG: 100 CAPSULE ORAL at 08:07

## 2023-10-21 RX ADMIN — LATANOPROST 1 DROP: 50 SOLUTION OPHTHALMIC at 22:31

## 2023-10-21 RX ADMIN — FLUTICASONE FUROATE AND VILANTEROL TRIFENATATE 1 PUFF: 100; 25 POWDER RESPIRATORY (INHALATION) at 22:32

## 2023-10-21 RX ADMIN — MONTELUKAST 10 MG: 10 TABLET, FILM COATED ORAL at 22:31

## 2023-10-21 RX ADMIN — KETOROLAC TROMETHAMINE 15 MG: 30 INJECTION, SOLUTION INTRAMUSCULAR at 12:59

## 2023-10-21 RX ADMIN — ROSUVASTATIN 5 MG: 10 TABLET, FILM COATED ORAL at 22:31

## 2023-10-21 RX ADMIN — LIDOCAINE 1 PATCH: 4 PATCH TOPICAL at 08:07

## 2023-10-21 RX ADMIN — GABAPENTIN 100 MG: 100 CAPSULE ORAL at 22:31

## 2023-10-21 ASSESSMENT — COGNITIVE AND FUNCTIONAL STATUS - GENERAL
DRESSING REGULAR LOWER BODY CLOTHING: A LITTLE
EATING MEALS: A LITTLE
PERSONAL GROOMING: A LITTLE
DAILY ACTIVITIY SCORE: 17
DAILY ACTIVITIY SCORE: 15
DAILY ACTIVITIY SCORE: 21
CLIMB 3 TO 5 STEPS WITH RAILING: A LOT
CLIMB 3 TO 5 STEPS WITH RAILING: A LOT
TOILETING: A LITTLE
CLIMB 3 TO 5 STEPS WITH RAILING: A LITTLE
MOBILITY SCORE: 21
MOBILITY SCORE: 14
MOVING FROM LYING ON BACK TO SITTING ON SIDE OF FLAT BED WITH BEDRAILS: A LOT
DRESSING REGULAR UPPER BODY CLOTHING: A LITTLE
MOVING TO AND FROM BED TO CHAIR: A LOT
TURNING FROM BACK TO SIDE WHILE IN FLAT BAD: A LITTLE
PERSONAL GROOMING: A LITTLE
HELP NEEDED FOR BATHING: A LOT
WALKING IN HOSPITAL ROOM: A LOT
WALKING IN HOSPITAL ROOM: A LOT
DRESSING REGULAR LOWER BODY CLOTHING: A LOT
HELP NEEDED FOR BATHING: A LITTLE
DRESSING REGULAR LOWER BODY CLOTHING: A LOT
MOVING TO AND FROM BED TO CHAIR: A LOT
TURNING FROM BACK TO SIDE WHILE IN FLAT BAD: A LOT
WALKING IN HOSPITAL ROOM: A LITTLE
STANDING UP FROM CHAIR USING ARMS: A LOT
MOVING FROM LYING ON BACK TO SITTING ON SIDE OF FLAT BED WITH BEDRAILS: A LITTLE
MOBILITY SCORE: 12
STANDING UP FROM CHAIR USING ARMS: A LOT
HELP NEEDED FOR BATHING: A LOT
DRESSING REGULAR UPPER BODY CLOTHING: A LITTLE
TOILETING: A LITTLE
TOILETING: A LOT
MOVING TO AND FROM BED TO CHAIR: A LITTLE

## 2023-10-21 ASSESSMENT — PAIN - FUNCTIONAL ASSESSMENT
PAIN_FUNCTIONAL_ASSESSMENT: 0-10

## 2023-10-21 ASSESSMENT — PAIN SCALES - GENERAL
PAINLEVEL_OUTOF10: 2
PAINLEVEL_OUTOF10: 5 - MODERATE PAIN
PAINLEVEL_OUTOF10: 5 - MODERATE PAIN
PAINLEVEL_OUTOF10: 0 - NO PAIN

## 2023-10-21 ASSESSMENT — PAIN DESCRIPTION - DESCRIPTORS: DESCRIPTORS: DISCOMFORT;ACHING

## 2023-10-21 NOTE — PROGRESS NOTES
Physical Therapy    Physical Therapy Evaluation    Patient Name: Edwin Stewart  MRN: [id number]  Today's Date: 10/21/2023   Time Calculation  Start Time: 1043  Stop Time: 1103  Time Calculation (min): 20 min    Assessment/Plan   PT Assessment  PT Assessment Results: Decreased strength, Decreased endurance, Impaired balance, Decreased mobility, Decreased skin integrity  Rehab Prognosis: Fair  Barriers to Discharge: chronic pain, decreased support at home  Evaluation/Treatment Tolerance: Patient limited by pain, Patient limited by fatigue  Medical Staff Made Aware: Yes  Strengths: Attitude of self, Support of extended family/friends  Barriers to Participation: Comorbidities  End of Session Communication: Bedside nurse  Assessment Comment: assist x2 needed for bed mobility, transfers, and gait. Pt limitetd by fatigue and chronic pain and generalized weakness. At high fall risk  End of Session Patient Position: Up in chair, Alarm on  IP OR SWING BED PT PLAN  Inpatient or Swing Bed: Inpatient  PT Plan  Treatment/Interventions: Bed mobility, Transfer training, Gait training, Balance training, Strengthening, Endurance training, Range of motion, Therapeutic exercise, Therapeutic activity, Home exercise program, Positioning  PT Plan: Skilled PT  PT Frequency: 3 times per week  PT Discharge Recommendations: Moderate intensity level of continued care  PT Recommended Transfer Status: Assist x2      Subjective   General Visit Information:  General  Reason for Referral: impaired mobility  Referred By: iker  Past Medical History Relevant to Rehab: scoliosis, spinal stenosis, HTN, short bowel syndrome, osteoporosis, asthma, COPD, kyphoplasty, hx of R hip surgery  Co-Treatment: OT  Co-Treatment Reason: co eval performed to maximize pt safety due to pt being at high risk for falls  Prior to Session Communication: Bedside nurse  Patient Position Received: Bed, 2 rail up  Preferred Learning Style: verbal  General Comment: pt seen in  room 2327. attempted earlier in day but pt off floor at MRI. Able to see pt later in day after MRI. Pt agreeable to PT Eval  Home Living:  Home Living  Type of Home: House  Lives With: Alone  Home Adaptive Equipment: Cane, Reacher, Walker rolling or standard  Home Layout: Multi-level  Home Access: Ramped entrance  Bathroom Shower/Tub: Tub only  Home Living Comments: reports having family and neighbor support; stairlifts installed between floors in home so pt does not have to ascend/descend steps  Prior Level of Function:  Prior Function Per Pt/Caregiver Report  Level of [place]: Independent with ADLs and functional transfers, Independent with homemaking with ambulation  Receives Help From: Family, Friends  Ambulatory Assistance: Independent  Prior Function Comments: uses RW mostly  Precautions:  Precautions  Precautions Comment: Fall risk, recent kyphoplasty with hx of chronic backpain and spinal deformities at T12-L3 (spinal precautions for comfort)  Vital Signs:       Objective   Pain:  Pain Assessment  Pain Assessment: 0-10  Pain Score: 5 - Moderate pain  Pain Type: Chronic pain  Pain Location: Back  Pain Descriptors: Discomfort, Aching  Pain Frequency: Constant/continuous  Effect of Pain on Daily Activities: limits ability to change positions and prolonged activity  Patient's Stated Pain Goal: No pain  Pain Interventions: Repositioned, Ambulation/increased activity, Distraction  Response to Interventions: pt reports decreased pain at end of session once repositioned in chair with waffle cushion and pillow  Cognition:  Cognition  Overall Cognitive Status: Within Functional Limits    General Assessments:  General Observation  General Observation: kyphotic posture, pleasant and conversational despite complaints of pain, cooperativeand willing to attempt all tasks (reports wounds/pressure sores on sacrum)             Activity Tolerance  Endurance: Tolerates less than 10 min exercise, no significant change in  vital signs  Activity Tolerance Comments: limited by chronic back pain and R hip pain    Sensation  Light Touch: No apparent deficits    Strength  Strength Comments: RLE weaker than LLE especially aat R hip (RLE grossly 3- to 3/5 in available ROM, LLE grossly 3+/5)           Coordination  Movements are Fluid and Coordinated: Yes (increased time needed to complete gross motor functiona lmobility tasks)         Static Sitting Balance  Static Sitting-Balance Support: Bilateral upper extremity supported  Static Sitting-Level of Assistance: Close supervision  Dynamic Sitting Balance  Dynamic Sitting-Balance Support: Bilateral upper extremity supported  Dynamic Sitting-Balance: Forward lean    Static Standing Balance  Static Standing-Balance Support: Bilateral upper extremity supported  Static Standing-Level of Assistance: Minimum assistance  Dynamic Standing Balance  Dynamic Standing-Balance Support: Bilateral upper extremity supported  Dynamic Standing-Balance: Turning, Reaching for objects  Dynamic Standing-Comments: Min A x2 ; BuE support progressing to single UE support used  Functional Assessments:       Bed Mobility  Bed Mobility: Yes  Bed Mobility 1  Bed Mobility 1: Supine to sitting (attempted logroll but pt could not roll onto R hip due to chronic pain)  Level of Assistance 1: Moderate assistance (x2)  Bed Mobility Comments 1: assist x2 given    Transfers  Transfer: Yes  Transfer 1  Transfer From 1: Bed to  Transfer to 1: Chair with arms  Technique 1: Stand pivot  Transfer Device 1: Walker  Transfer Level of Assistance 1: Minimum assistance (x2)    Ambulation/Gait Training  Ambulation/Gait Training Performed: Yes  Ambulation/Gait Training 1  Surface 1: Level tile  Device 1: Rolling walker  Assistance 1: Minimum assistance (x2)  Quality of Gait 1: Narrow base of support (decreased step height and length)  Comments/Distance (ft) 1: 5     Outcome Measures:  [facility] Basic Mobility  Turning from your back to your side  while in a flat bed without using bedrails: A lot  Moving from lying on your back to sitting on the side of a flat bed without using bedrails: A lot  Moving to and from bed to chair (including a wheelchair): A lot  Standing up from a chair using your arms (e.g. wheelchair or bedside chair): A lot  To walk in hospital room: A lot  Climbing 3-5 steps with railing: A lot  Basic Mobility - Total Score: 12    Encounter Problems       Encounter Problems (Active)       Mobility       STG - Patient will ambulate up to at least 100' with LRD with CGA x1 and minimal complaints of pain (<4/10) and no LOB to increase indep in room/facility (Progressing)       Start:  10/21/23    Expected End:  11/04/23               Transfers       STG - Patient will perform bed mobility (supine<>sit) with Min A x1 using logrolling technique as able to protect spine to increase indep with getting in/out of bed safely (Progressing)       Start:  10/21/23    Expected End:  11/04/23            STG- Patient will be able to perform STS and SPT transfers with LRD from bed/chair with CGA x1 to increase indep in room (Progressing)       Start:  10/21/23    Expected End:  11/04/23                   Education Documentation  Precautions, taught by Alexandra Pollard, PT at 10/21/2023 11:40 AM.  Learner: Patient  Readiness: Acceptance  Method: Explanation, Demonstration  Response: Verbalizes Understanding  Comment: fall prevention strategies and spinal precautions/proper body mechanics to reduce complaints of back pain    Mobility Training, taught by Alexandra Pollard, PT at 10/21/2023 11:39 AM.  Learner: Patient  Readiness: Acceptance  Method: Explanation, Demonstration  Response: Verbalizes Understanding  Comment: Safe transfer techniques to reduce fallsincluding safe hand placement, positioning of AD, and controlled descent to surface.Log roll technique to protect spine during bed mobility.    Education Comments  No comments found.

## 2023-10-21 NOTE — PROGRESS NOTES
Edwin Stewart is a 78 y.o. male on day 0 of admission presenting with Low back pain without sciatica, unspecified back pain laterality, unspecified chronicity.      Subjective   Edwin Stewart is a 78 y.o. male with past medical history of scoliosis, spinal stenosis, hypertension, short-bowel syndrome, osteoporosis, asthma/COPD presenting with acute worsening of chronic back pain and weakness.  Patient states over the past 3 months he has had chronic back pain that has been attributed to scoliosis.  Patient had a kyphoplasty 3 weeks ago in [place].  He states that each and every morning his back is worse and now it is at a point where he is unable to ambulate due to severe pain in the low back.  He denies pain radiating down the legs but states that he has weakness with ambulation worsened on the right hip then the left but states he had chronic right hip pains before from traumatic injury.  Patient states he is having loss of bladder and bowel function over the past 3 weeks as well.  He has developed some bedsores that he is treating with topical triple antibiotic ointment and Vaseline.  He has been seeing home physical therapy/Occupational Therapy without improvement.  In the emergency room patient was complaining about increasing low back pain.  ER physician has given him Toradol and up to 12 mg now of morphine without any significant improvement in his back pain and patient now states that he is dizzy and unable to ambulate because he is dizzy and afraid of falling.  Patient attributes to the dizziness and gait instability to the morphine that he received.  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  Significant L2-3 and L4-5 spinal stenosis related to disc bulging ligamentous hypertrophy and facet arthropathy left L2 neuroforaminal narrowing related to  vertebral body hypertrophy and facet arthropathy.  CBC is unremarkable.  BMP significant for sodium of 130.  C-reactive protein is 2.26 sed rate of 58.  Given the patient has subacute incontinence of bowel and bladder function with lower leg weakness concern for spinal stenosis or cauda equina syndrome is raised.  CT showed spinal stenosis.  We will obtain a MRI to make certain that we do not have any type of hematoma formation from patient's kyphoplasty 3 weeks ago.  Patient does not have any fevers or chills or nothing to suspect a spinal abscess.  Neuro consultation as well as orthopedics consultation will be obtained.  MRI to be repeated (last MRI was 3 months ago and did not show significant stenoses).  Patient is to have physical therapy occupational therapy and further evaluation.  Of note patient has early decubiti and changes.      10/21: Patient was sitting on the chair at the bedside when I saw her today.  Pain is now fairly well controlled.  Incontinence of urine fecal matter has been on for years according to him he was seeing a urologist for the urinary incontinence.  He has had an extensive history of scoliosis and had a kyphosis in the recent 3 to 4 weeks.  MRI was done and is still pending report.  Orthopedic surgery has yet to see the patient.       Objective     Last Recorded Vitals  /63 (BP Location: Left arm, Patient Position: Lying)   Pulse 76   Temp 36.3 °C (97.4 °F) (Temporal)   Resp 18   Wt 67.5 kg (148 lb 14.4 oz)   SpO2 93%   Intake/Output last 3 Shifts:    Intake/Output Summary (Last 24 hours) at 10/21/2023 1220  Last data filed at 10/21/2023 1108  Gross per 24 hour   Intake 985 ml   Output 250 ml   Net 735 ml       Admission Weight  Weight: 55.8 kg (123 lb) (10/20/23 1245)    Daily Weight  10/20/23 : 67.5 kg (148 lb 14.4 oz)    Image Results  CT lumbar spine wo IV contrast  Narrative: STUDY:  CT Lumbar Spine without IV Contrast; 10/20/2023, 2:41PM.  INDICATION:  Low back  pain.  COMPARISON:  CT AP 11/30/2021.  ACCESSION NUMBER(S):  [id number]  ORDERING CLINICIAN:  KAY CUTLER  TECHNIQUE:  CT of the lumbar spine was performed without intravenous  or intrathecal contrast.  Sagittal and coronal reconstructions were  generated.    Automated mA/kV exposure control was utilized and patient examination  was performed in strict accordance with principles of ALARA.  FINDINGS:   Prominent gibbus deformity noted at T12-L1 related to chronic  fractures of the T12 vertebral body and the superior endplate of L1.   T12 kyphoplasty changes are present.  Additional chronic appearing  compression deformities to L3.  Bony structures are diffusely  osteoporotic.  Coronal view also demonstrates levoscoliosis centered  at T12-L1.  Prominent multilevel disc space narrowing.  Mild narrowing of the  spinal canal at L2-3 related to the scoliosis and bony hypertrophy.   Additional L4-5 spinal stenosis due to disc bulging, ligamentous  hypertrophy and facet arthropathy.  Left L2 neural foraminal narrowing  related to vertebral body hypertrophy and facet arthropathy.    The paravertebral soft tissues are within normal limits.  The  visualized abdomen is unremarkable.  Impression: Significant osteopenia.  Chronic compression deformities of T12, L1  and L3. 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 Significant L2-3 and L4-5 spinal stenosis related to disc  bulging, ligamentous hypertrophy and facet arthropathy. Left L2 neural  foraminal narrowing related to vertebral body hypertrophy and facet  arthropathy.  Signed by Gilbert Walton MD  Vascular US lower extremity venous duplex bilateral  Narrative: STUDY:  Bilateral Lower Extremity Venous Doppler Ultrasound; 2:10 PM  INDICATION:  BLE edema x three days.  COMPARISON:  None available.  ACCESSION NUMBER(S):  [id number]  ORDERING CLINICIAN:  KAY CUTLER  TECHNIQUE:    Real-time grayscale, color, and spectral doppler  ultrasound imaging of  the bilateral lower extremity veins was performed.  FINDINGS:   The bilateral common femoral, profunda femoral, femoral and popliteal  veins demonstrated normal compressibility, normal phasic venous flow  and normal response to augmentation.  There is no evidence for  echogenic thrombi.  The visualized deep calf veins are patent.    Impression: No evidence for DVT within the bilateral lower extremities.  Signed by Spencer Sales MD      Physical Exam  Constitutional: Chronically ill looking emaciated elderly  male who is conscious alert afebrile anicteric not pale.  Head: Is atraumatic and normal cephalic  Eyes: PERRLA  Skin: Dry normal color for age and race  Chest: Clear to auscultations bilaterally  CVS: S1-S2 no murmurs rubs or gallops  Abdomen: Flat soft moves respiration bowel sounds present nontender  Extremities: No pitting pedal edema bilaterally  Psychiatric: Normal mood and affect  Relevant Results               Assessment/Plan                  Principal Problem:    Low back pain without sciatica, unspecified back pain laterality, unspecified chronicity  Active Problems:    Personal history of other diseases of the digestive system    Gait disturbance    #1 spinal stenosis with gait abnormality      Concern is raised for the possibility of spinal stenosis with cord compression.  MRI of the lumbar spine and sacrum completed and pending results.   Patient has had incontinence of bladder and bowel function for the last 3 weeks.  History of having kyphoplasty approximately 3 weeks ago.  Rule out spinal cord hemorrhage.  Do not see evidence of abscess patient does not have any fevers chills or leukocytosis.  Patient with known history of scoliosis and compression fractures with moderate spinal stenosis on previous MRI 3 months ago.  But with the changing of neurological symptoms MRI is important to obtain once again.  Consultation with neurology as well as orthopedic surgery for  possible cord compression syndrome needs to be entertained.       #2 incontinence of bladder and bowel      Concern for cord compression is raised.  This is subacute is been going on for over 3 weeks.  MRI of the lumbar spine to be obtained.     #3 gait disturbance      Patient is to have physical therapy occupational therapy to evaluate.  Patient has decent peripheral strength in the lower extremities but does have proximal weakness right worse than left.  Neurology consultation as above.     #4 history of short-bowel syndrome      Patient reports that he is only has 8 foot of small bowel and has chronic soft stools and diarrhea.  It is making things worse with his incontinence of stool.  Patient has early skin breakdown.      #5 early sacral decubiti      Patient is to have wound care consultation for recommendations besides avoiding soiling contamination of the region and increase mobility.     #6 hypertension       Continue home meds once verified     #7 COPD/reactive airway disease      As needed albuterol continue patient on Advair              Kristina Isaac MD

## 2023-10-21 NOTE — ED NOTES
Pt transported to 2327 by ER EMT-P on ED stretcher with safety/fall precautions, mask on, belongings, paperwork, family, monitoring, IV infusing. No significant detrimental changes prior to transfer. Neuro/skin/respiratory grossly WDL.      Gilbert Yanez RN  10/20/23 6493

## 2023-10-21 NOTE — H&P
History Of Present Illness  Edwin Stewart is a 78 y.o. male with past medical history of scoliosis, spinal stenosis, hypertension, short-bowel syndrome, osteoporosis, asthma/COPD presenting with acute worsening of chronic back pain and weakness.  Patient states over the past 3 months he has had chronic back pain that has been attributed to scoliosis.  Patient had a kyphoplasty 3 weeks ago in [place].  He states that each and every morning his back is worse and now it is at a point where he is unable to ambulate due to severe pain in the low back.  He denies pain radiating down the legs but states that he has weakness with ambulation worsened on the right hip then the left but states he had chronic right hip pains before from traumatic injury.  Patient states he is having loss of bladder and bowel function over the past 3 weeks as well.  He has developed some bedsores that he is treating with topical triple antibiotic ointment and Vaseline.  He has been seeing home physical therapy/Occupational Therapy without improvement.  In the emergency room patient was complaining about increasing low back pain.  ER physician has given him Toradol and up to 12 mg now of morphine without any significant improvement in his back pain and patient now states that he is dizzy and unable to ambulate because he is dizzy and afraid of falling.  Patient attributes to the dizziness and gait instability to the morphine that he received.  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  Significant L2-3 and L4-5 spinal stenosis related to disc bulging ligamentous hypertrophy and facet arthropathy left L2 neuroforaminal narrowing related to vertebral body hypertrophy and facet arthropathy.  CBC is unremarkable.  BMP significant for sodium of 130.  C-reactive protein is 2.26 sed rate of  58.  Given the patient has subacute incontinence of bowel and bladder function with lower leg weakness concern for spinal stenosis or cauda equina syndrome is raised.  CT showed spinal stenosis.  We will obtain a MRI to make certain that we do not have any type of hematoma formation from patient's kyphoplasty 3 weeks ago.  Patient does not have any fevers or chills or nothing to suspect a spinal abscess.  Neuro consultation as well as orthopedics consultation will be obtained.  MRI to be repeated (last MRI was 3 months ago and did not show significant stenoses).  Patient is to have physical therapy occupational therapy and further evaluation.  Of note patient has early decubiti and changes.      Past Medical History  He has a past medical history of Asthma, Cholecystitis, Chronic back pain, Hypertension, Osteoporosis, Personal history of other diseases of the circulatory system (07/22/2021), Personal history of other diseases of the digestive system, and Scoliosis.    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  Patient reports having multiple abdominal surgeries and hernia repairs.  States he only has 8 foot of small bowel remaining.  Has had appendectomy as well.  Social History  He reports that he has never smoked. He has never used smokeless tobacco. He reports that he does not currently use alcohol. He reports that he does not use drugs.    Family History  Family medical history reviewed and noncontributory  Allergies  Tamsulosin, Adhesive tape-silicones, Bee pollen, Brimonidine, Cat dander, Dorzolamide-timolol, Gluten, Hydrocortisone, Oxybutynin, Perfume, Trichophyton mentagrophytes allergenic  extract, Aluminum chlorohydrate, Beta-blockers (beta-adrenergic blocking agts), and Cortisone    Meds    Current Facility-Administered Medications:     lidocaine 4 % patch 1 patch, 1 patch, transdermal, Daily, Marleen Ellison MD, 1 patch at 10/20/23 1740  No current outpatient medications on file.  Patient has extensive list of outpatient medications does not have complete list with him      Review of Systems   Constitutional:  Negative for chills, fatigue and fever.   HENT:  Negative for congestion and sore throat.    Eyes: Negative.    Respiratory:  Negative for cough, chest tightness, shortness of breath and wheezing.    Cardiovascular:  Positive for leg swelling. Negative for palpitations.   Gastrointestinal:  Positive for diarrhea. Negative for abdominal pain, blood in stool, nausea and vomiting.        History of short-bowel syndrome with chronic diarrhea  Recent incontinence of bowel   Endocrine: Negative for polydipsia and polyuria.   Genitourinary:  Negative for dysuria and flank pain.        Chronic incontinence of urine for the past 3 weeks   Musculoskeletal:  Negative for back pain, gait problem and neck pain.   Skin: Negative.    Neurological:  Positive for dizziness and weakness. Negative for seizures, speech difficulty and numbness.        Subacute incontinence of urine and bowel with lower leg weakness over the past 3 weeks   Psychiatric/Behavioral:  Negative for confusion.         Physical Exam  Constitutional:       Appearance: Normal appearance.   HENT:      Head: Normocephalic and atraumatic.      Mouth/Throat:      Mouth: Mucous membranes are moist.   Eyes:      Extraocular Movements: Extraocular movements intact.      Pupils: Pupils are equal, round, and reactive to light.   Cardiovascular:      Rate and Rhythm: Normal rate and regular rhythm.      Pulses: Normal pulses.      Heart sounds: Normal heart sounds.   Pulmonary:      Effort: Pulmonary effort is normal.      Breath sounds: Normal  breath sounds. No wheezing, rhonchi or rales.   Abdominal:      General: Abdomen is flat. Bowel sounds are normal.      Palpations: Abdomen is soft.      Tenderness: There is no guarding.   Musculoskeletal:         General: Normal range of motion.      Right lower leg: Edema present.      Left lower leg: Edema present.   Skin:     Coloration: Skin is not jaundiced.      Findings: No erythema or rash.      Comments: Patient with early skin breakdown and sacral region.   Neurological:      Mental Status: He is alert and oriented to person, place, and time.      Motor: Weakness present.      Gait: Gait abnormal.      Comments: Patient with hip flexor weakness in the right.  No subjective paresthesias of the legs.  Incontinence of bladder and bowel function present.   Psychiatric:         Mood and Affect: Mood normal.         Thought Content: Thought content normal.         Judgment: Judgment normal.          Last Recorded Vitals  /60 (BP Location: Left arm, Patient Position: Lying)   Pulse 86   Temp 36.8 °C (98.3 °F) (Temporal)   Resp 18   Wt 55.8 kg (123 lb)   SpO2 94%     Relevant Results     Results for orders placed or performed during the hospital encounter of 10/20/23 (from the past 24 hour(s))   CBC and Auto Differential   Result Value Ref Range    WBC 9.2 4.4 - 11.3 x10*3/uL    nRBC 0.0 0.0 - 0.0 /100 WBCs    RBC 3.60 (L) 4.50 - 5.90 x10*6/uL    Hemoglobin 10.7 (L) 13.5 - 17.5 g/dL    Hematocrit 32.1 (L) 41.0 - 52.0 %    MCV 89 80 - 100 fL    MCH 29.7 26.0 - 34.0 pg    MCHC 33.3 32.0 - 36.0 g/dL    RDW 13.9 11.5 - 14.5 %    Platelets 464 (H) 150 - 450 x10*3/uL    MPV 9.7 7.5 - 11.5 fL    Neutrophils % 71.2 40.0 - 80.0 %    Immature Granulocytes %, Automated 0.4 0.0 - 0.9 %    Lymphocytes % 16.8 13.0 - 44.0 %    Monocytes % 11.3 2.0 - 10.0 %    Eosinophils % 0.0 0.0 - 6.0 %    Basophils % 0.3 0.0 - 2.0 %    Neutrophils Absolute 6.54 (H) 1.60 - 5.50 x10*3/uL    Immature Granulocytes Absolute, Automated  0.04 0.00 - 0.50 x10*3/uL    Lymphocytes Absolute 1.55 0.80 - 3.00 x10*3/uL    Monocytes Absolute 1.04 (H) 0.05 - 0.80 x10*3/uL    Eosinophils Absolute 0.00 0.00 - 0.40 x10*3/uL    Basophils Absolute 0.03 0.00 - 0.10 x10*3/uL   Basic metabolic panel   Result Value Ref Range    Glucose 88 74 - 99 mg/dL    Sodium 130 (L) 136 - 145 mmol/L    Potassium 3.9 3.5 - 5.3 mmol/L    Chloride 94 (L) 98 - 107 mmol/L    Bicarbonate 30 21 - 32 mmol/L    Anion Gap 10 10 - 20 mmol/L    Urea Nitrogen 15 6 - 23 mg/dL    Creatinine 0.73 0.50 - 1.30 mg/dL    eGFR >90 >60 mL/min/1.73m*2    Calcium 9.9 8.6 - 10.3 mg/dL   Sedimentation Rate   Result Value Ref Range    Sedimentation Rate 58 (H) 0 - 20 mm/h   C-Reactive Protein   Result Value Ref Range    C-Reactive Protein 2.26 (H) <1.00 mg/dL        Recent Imaging  CT lumbar spine wo IV contrast    Result Date: 10/20/2023  STUDY: CT Lumbar Spine without IV Contrast; 10/20/2023, 2:41PM. INDICATION: Low back pain. COMPARISON: CT AP 11/30/2021. ACCESSION NUMBER(S): [id number] ORDERING CLINICIAN: KAY CUTLER TECHNIQUE:  CT of the lumbar spine was performed without intravenous or intrathecal contrast.  Sagittal and coronal reconstructions were generated.  Automated mA/kV exposure control was utilized and patient examination was performed in strict accordance with principles of ALARA. FINDINGS: Prominent gibbus deformity noted at T12-L1 related to chronic fractures of the T12 vertebral body and the superior endplate of L1. T12 kyphoplasty changes are present.  Additional chronic appearing compression deformities to L3.  Bony structures are diffusely osteoporotic.  Coronal view also demonstrates levoscoliosis centered at T12-L1. Prominent multilevel disc space narrowing.  Mild narrowing of the spinal canal at L2-3 related to the scoliosis and bony hypertrophy. Additional L4-5 spinal stenosis due to disc bulging, ligamentous hypertrophy and facet arthropathy.  Left L2 neural foraminal  narrowing related to vertebral body hypertrophy and facet arthropathy.  The paravertebral soft tissues are within normal limits.  The visualized abdomen is unremarkable.    Significant osteopenia.  Chronic compression deformities of T12, L1 and L3. 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 Significant L2-3 and L4-5 spinal stenosis related to disc bulging, ligamentous hypertrophy and facet arthropathy. Left L2 neural foraminal narrowing related to vertebral body hypertrophy and facet arthropathy. Signed by Gilbert Walton MD    Vascular US lower extremity venous duplex bilateral    Result Date: 10/20/2023  STUDY: Bilateral Lower Extremity Venous Doppler Ultrasound; 2:10 PM INDICATION: BLE edema x three days. COMPARISON: None available. ACCESSION NUMBER(S): [id number] ORDERING CLINICIAN: KAY CUTLER TECHNIQUE:  Real-time grayscale, color, and spectral doppler ultrasound imaging of the bilateral lower extremity veins was performed. FINDINGS: The bilateral common femoral, profunda femoral, femoral and popliteal veins demonstrated normal compressibility, normal phasic venous flow and normal response to augmentation.  There is no evidence for echogenic thrombi.  The visualized deep calf veins are patent.      No evidence for DVT within the bilateral lower extremities. Signed by Spencer Sales MD       Assessment and Plan  #1 spinal stenosis with gait abnormality      Concern is raised for the possibility of spinal stenosis with cord compression.  MRI of the lumbar spine and sacrum will be obtained.  Patient has had incontinence of bladder and bowel function for the last 3 weeks.  History of having kyphoplasty approximately 3 weeks ago.  Rule out spinal cord hemorrhage.  Do not see evidence of abscess patient does not have any fevers chills or leukocytosis.  Patient with known history of scoliosis and compression fractures with moderate spinal stenosis on previous MRI 3 months ago.  But  with the changing of neurological symptoms MRI is important to obtain once again.  Consultation with neurology as well as orthopedic surgery for possible cord compression syndrome needs to be entertained.  Since patient's symptoms have been going on for 3 weeks will not emergently call an MRI this evening but will obtain in the morning.    #2 incontinence of bladder and bowel      Concern for cord compression is raised.  This is subacute is been going on for over 3 weeks.  MRI of the lumbar spine to be obtained.    #3 gait disturbance      Patient is to have physical therapy occupational therapy to evaluate.  Patient has decent peripheral strength in the lower extremities but does have proximal weakness right worse than left.  Neurology consultation as above.    #4 history of short-bowel syndrome      Patient reports that he is only has 8 foot of small bowel and has chronic soft stools and diarrhea.  It is making things worse with his incontinence of stool.  Patient has early skin breakdown.     #5 early sacral decubiti      Patient is to have wound care consultation for recommendations besides avoiding soiling contamination of the region and increase mobility.    #6 hypertension       Continue home meds once verified    #7 COPD/reactive airway disease      As needed albuterol continue patient on Advair      Edwin Aguayo MD

## 2023-10-21 NOTE — CARE PLAN
Problem: Pain  Goal: Takes deep breaths with improved pain control throughout the shift  Outcome: Progressing  Goal: Turns in bed with improved pain control throughout the shift  Outcome: Progressing  Goal: Walks with improved pain control throughout the shift  Outcome: Progressing  Goal: Performs ADL's with improved pain control throughout shift  Outcome: Progressing  Goal: Participates in PT with improved pain control throughout the shift  Outcome: Progressing  Goal: Free from opioid side effects throughout the shift  Outcome: Progressing  Goal: Free from acute confusion related to pain meds throughout the shift  Outcome: Progressing     Problem: Skin  Goal: Decreased wound size/increased tissue granulation at next dressing change  Outcome: Progressing  Goal: Participates in plan/prevention/treatment measures  Outcome: Progressing  Goal: Prevent/manage excess moisture  Outcome: Progressing  Goal: Prevent/minimize sheer/friction injuries  Outcome: Progressing  Goal: Promote/optimize nutrition  Outcome: Progressing  Goal: Promote skin healing  Outcome: Progressing

## 2023-10-21 NOTE — ED NOTES
Pt was able to simulate home situation and get up from sitting position using a walker w/o assistance and ambulate. Pt denies increase in pain during activity. Provider notified.     Gilbert Yanez RN  10/20/23 2057

## 2023-10-21 NOTE — PROGRESS NOTES
Occupational Therapy                 Therapy Communication Note    Patient Name: Edwin Stewart  MRN: [id number]  Today's Date: 10/21/2023     Discipline: Occupational Therapy    Missed Visit Reason: Missed Visit Reason:  (Pt at MRI.)    Missed Time: Attempt    Comment: will continue to follow for OT evaluation as appropriate.

## 2023-10-21 NOTE — PROGRESS NOTES
Occupational Therapy    Evaluation    Patient Name: Edwin Stewart  MRN: [id number]  Today's Date: 10/21/2023  Time Calculation  Start Time: 1040  Stop Time: 1102  Time Calculation (min): 22 min        Assessment:  OT Assessment: Pt is a 79 y/o M who presents to this facility s/p back pain. pt presents with performance deficits in strength, balance, and functional activity tolerance limiting ability to complete ADL and IADL tasks. Pt demonstrates ability to complete bed mobility tasks with MOD A, MIN A x2 for ADL transfers. Increased assistance required for ADL tasks. Pt would continue to benefit from skilled OT services.  Prognosis: Good  Evaluation/Treatment Tolerance: Patient tolerated treatment well  Medical Staff Made Aware: Yes  End of Session Communication: Bedside nurse  End of Session Patient Position: Up in chair, Alarm on  Prognosis: Good  Evaluation/Treatment Tolerance: Patient tolerated treatment well  Medical Staff Made Aware: Yes  Plan:  Treatment Interventions: ADL retraining, Functional transfer training, UE strengthening/ROM, Endurance training, Patient/family training, Equipment evaluation/education, Compensatory technique education  OT Frequency: 3 times per week  OT Discharge Recommendations: Moderate intensity level of continued care  Equipment Recommended upon Discharge:  (reacher, sock-aide)  Treatment Interventions: ADL retraining, Functional transfer training, UE strengthening/ROM, Endurance training, Patient/family training, Equipment evaluation/education, Compensatory technique education    Subjective   Current Problem:  1. Low back pain without sciatica, unspecified back pain laterality, unspecified chronicity          General:  General  Reason for Referral: Pt presents to this facility s/p acute worsening of chronic back pain and weakness.  Referred By: Olivier  Past Medical History Relevant to Rehab: past medical history of scoliosis, spinal stenosis, hypertension, short-bowel syndrome,  osteoporosis, asthma/COPD  Missed Visit: Yes  Missed Visit Reason:  (Pt at MRI.)  Co-Treatment: PT  Co-Treatment Reason: Unknown level of assistance with ADL and functional transfers. Maximize safety with functional tasks.  Prior to Session Communication: Bedside nurse  Patient Position Received: Bed, 3 rail up, Alarm off, not on at start of session  Precautions:  Medical Precautions: Fall precautions     Pain:  Pain Assessment  Pain Assessment: 0-10  Pain Score: 5 - Moderate pain  Pain Location:  (Lower back)    Objective   Cognition:  Overall Cognitive Status: Within Functional Limits           Home Living:  Type of Home: Apartment  Lives With: Alone  Home Adaptive Equipment:  (Rollator, RW, SPC)  Home Living Comments: 3 level home with 0 SRINIVASAN. Pt stating he has stair lifts. Stating he has supportive neighbors and siblings to assist as needed with functional tasks.  Prior Function:  Prior Function Comments: Pt reporting mostly IND with ADL and IADL tasks, however notable difficulty completing higher level tasks.  IADL History:     ADL:  ADL Comments: Pt agreeable to OT evaluation. Completed bed mobility tasks with MOD A. Difficulty scooting to EOB or completing log roll technique. Pt completed LBD tasks of donning pants with MAX A.  Activity Tolerance:  Endurance:  (Rest breaks provided seated on EOB. F- functional activity tolerance)  Bed Mobility/Transfers: Bed Mobility  Bed Mobility: Yes  Bed Mobility 1  Bed Mobility 1: Supine to sitting  Level of Assistance 1: Moderate assistance  Bed Mobility Comments 1: Education provided regarding log roll technique however pt unable to complete secondary to hx of pain on R hip. Assist with uprighting trunk and bringing BLEs over EOB.   and Transfers  Transfer: Yes  Transfer 1  Technique 1: Sit to stand, Stand to sit  Transfer Device 1: Walker  Transfer Level of Assistance 1: +2, Minimum assistance  Transfers 2  Trials/Comments 2: Functional amb to bedside chair using RW  with MIN A x2.   Strength:  Strength Comments: Generalized weakness, BUEs 3+/5.      Outcome Measures:[facility] Daily Activity  Putting on and taking off regular lower body clothing: A lot  Bathing (including washing, rinsing, drying): A lot  Putting on and taking off regular upper body clothing: A little  Toileting, which includes using toilet, bedpan or urinal: A lot  Taking care of personal grooming such as brushing teeth: A little  Eating Meals: A little  Daily Activity - Total Score: 15        Education Documentation  Handouts, taught by Vandana Jacobs OT at 10/21/2023 11:28 AM.  Learner: Patient  Readiness: Acceptance  Method: Explanation, Demonstration  Response: Verbalizes Understanding, Demonstrated Understanding    Body Mechanics, taught by Vandana Jacobs OT at 10/21/2023 11:28 AM.  Learner: Patient  Readiness: Acceptance  Method: Explanation, Demonstration  Response: Verbalizes Understanding, Demonstrated Understanding    Precautions, taught by Vandana Jacobs OT at 10/21/2023 11:28 AM.  Learner: Patient  Readiness: Acceptance  Method: Explanation, Demonstration  Response: Verbalizes Understanding, Demonstrated Understanding    ADL Training, taught by Vandana Jacobs OT at 10/21/2023 11:28 AM.  Learner: Patient  Readiness: Acceptance  Method: Explanation, Demonstration  Response: Verbalizes Understanding, Demonstrated Understanding    Education Comments  No comments found.        OP EDUCATION:  Education  Individual(s) Educated: Patient  Education Provided: Diagnosis & Precautions, Symptom management, Fall precautons, Risk and benefits of OT discussed with patient or other, POC discussed and agreed upon  Risk and Benefits Discussed with Patient/Caregiver/Other: yes  Patient/Caregiver Demonstrated Understanding: yes  Plan of Care Discussed and Agreed Upon: yes  Patient Response to Education: Patient/Caregiver Verbalized Understanding of Information    Goals:  Encounter Problems       Encounter  Problems (Active)       OT Goals       Pt will complete ADL transfers with CGA using LRAD.  (Progressing)       Start:  10/21/23    Expected End:  11/04/23            Pt will complete bed mobility tasks with MIN A as needed to complete ADL tasks.  (Progressing)       Start:  10/21/23    Expected End:  11/04/23            pt will complete LBD tasks with MIN A using AE/DME as needed.  (Progressing)       Start:  10/21/23    Expected End:  11/04/23            Pt completed toileting routine with MIN A.  (Progressing)       Start:  10/21/23    Expected End:  11/04/23            Pt will demonstrate F+ functional activity tolerance during ADL tasks.  (Progressing)       Start:  10/21/23    Expected End:  11/04/23

## 2023-10-22 LAB
ANION GAP SERPL CALC-SCNC: 8 MMOL/L (ref 10–20)
BASOPHILS # BLD AUTO: 0.03 X10*3/UL (ref 0–0.1)
BASOPHILS NFR BLD AUTO: 0.4 %
BUN SERPL-MCNC: 21 MG/DL (ref 6–23)
CALCIUM SERPL-MCNC: 8.8 MG/DL (ref 8.6–10.3)
CHLORIDE SERPL-SCNC: 99 MMOL/L (ref 98–107)
CO2 SERPL-SCNC: 29 MMOL/L (ref 21–32)
CREAT SERPL-MCNC: 0.86 MG/DL (ref 0.5–1.3)
EOSINOPHIL # BLD AUTO: 0.03 X10*3/UL (ref 0–0.4)
EOSINOPHIL NFR BLD AUTO: 0.4 %
ERYTHROCYTE [DISTWIDTH] IN BLOOD BY AUTOMATED COUNT: 13.7 % (ref 11.5–14.5)
GFR SERPL CREATININE-BSD FRML MDRD: 89 ML/MIN/1.73M*2
GLUCOSE SERPL-MCNC: 138 MG/DL (ref 74–99)
HCT VFR BLD AUTO: 28.9 % (ref 41–52)
HGB BLD-MCNC: 9.6 G/DL (ref 13.5–17.5)
IMM GRANULOCYTES # BLD AUTO: 0.03 X10*3/UL (ref 0–0.5)
IMM GRANULOCYTES NFR BLD AUTO: 0.4 % (ref 0–0.9)
LYMPHOCYTES # BLD AUTO: 1.22 X10*3/UL (ref 0.8–3)
LYMPHOCYTES NFR BLD AUTO: 16.2 %
MAGNESIUM SERPL-MCNC: 1.46 MG/DL (ref 1.6–2.4)
MCH RBC QN AUTO: 29.7 PG (ref 26–34)
MCHC RBC AUTO-ENTMCNC: 33.2 G/DL (ref 32–36)
MCV RBC AUTO: 90 FL (ref 80–100)
MONOCYTES # BLD AUTO: 0.88 X10*3/UL (ref 0.05–0.8)
MONOCYTES NFR BLD AUTO: 11.7 %
NEUTROPHILS # BLD AUTO: 5.33 X10*3/UL (ref 1.6–5.5)
NEUTROPHILS NFR BLD AUTO: 70.9 %
NRBC BLD-RTO: 0 /100 WBCS (ref 0–0)
PLATELET # BLD AUTO: 462 X10*3/UL (ref 150–450)
PMV BLD AUTO: 10.6 FL (ref 7.5–11.5)
POTASSIUM SERPL-SCNC: 3.9 MMOL/L (ref 3.5–5.3)
RBC # BLD AUTO: 3.23 X10*6/UL (ref 4.5–5.9)
SODIUM SERPL-SCNC: 132 MMOL/L (ref 136–145)
WBC # BLD AUTO: 7.5 X10*3/UL (ref 4.4–11.3)

## 2023-10-22 PROCEDURE — 2500000005 HC RX 250 GENERAL PHARMACY W/O HCPCS: Performed by: STUDENT IN AN ORGANIZED HEALTH CARE EDUCATION/TRAINING PROGRAM

## 2023-10-22 PROCEDURE — 83735 ASSAY OF MAGNESIUM: CPT | Performed by: INTERNAL MEDICINE

## 2023-10-22 PROCEDURE — 2500000004 HC RX 250 GENERAL PHARMACY W/ HCPCS (ALT 636 FOR OP/ED): Performed by: INTERNAL MEDICINE

## 2023-10-22 PROCEDURE — 85025 COMPLETE CBC W/AUTO DIFF WBC: CPT | Performed by: INTERNAL MEDICINE

## 2023-10-22 PROCEDURE — 2500000001 HC RX 250 WO HCPCS SELF ADMINISTERED DRUGS (ALT 637 FOR MEDICARE OP): Performed by: INTERNAL MEDICINE

## 2023-10-22 PROCEDURE — 36415 COLL VENOUS BLD VENIPUNCTURE: CPT | Performed by: INTERNAL MEDICINE

## 2023-10-22 PROCEDURE — 99233 SBSQ HOSP IP/OBS HIGH 50: CPT | Performed by: INTERNAL MEDICINE

## 2023-10-22 PROCEDURE — 84132 ASSAY OF SERUM POTASSIUM: CPT | Performed by: INTERNAL MEDICINE

## 2023-10-22 PROCEDURE — 1210000001 HC SEMI-PRIVATE ROOM DAILY

## 2023-10-22 RX ADMIN — KETOROLAC TROMETHAMINE 15 MG: 30 INJECTION, SOLUTION INTRAMUSCULAR at 12:47

## 2023-10-22 RX ADMIN — ROSUVASTATIN 5 MG: 10 TABLET, FILM COATED ORAL at 22:04

## 2023-10-22 RX ADMIN — GABAPENTIN 100 MG: 100 CAPSULE ORAL at 08:29

## 2023-10-22 RX ADMIN — LOSARTAN POTASSIUM 50 MG: 50 TABLET, FILM COATED ORAL at 08:29

## 2023-10-22 RX ADMIN — MONTELUKAST 10 MG: 10 TABLET, FILM COATED ORAL at 22:04

## 2023-10-22 RX ADMIN — LATANOPROST 1 DROP: 50 SOLUTION OPHTHALMIC at 22:03

## 2023-10-22 RX ADMIN — FLUTICASONE FUROATE AND VILANTEROL TRIFENATATE 1 PUFF: 100; 25 POWDER RESPIRATORY (INHALATION) at 22:03

## 2023-10-22 RX ADMIN — LIDOCAINE 1 PATCH: 4 PATCH TOPICAL at 08:29

## 2023-10-22 RX ADMIN — LEVOTHYROXINE SODIUM 75 MCG: 0.07 TABLET ORAL at 06:48

## 2023-10-22 RX ADMIN — ASPIRIN 81 MG: 81 TABLET, COATED ORAL at 08:29

## 2023-10-22 RX ADMIN — GABAPENTIN 100 MG: 100 CAPSULE ORAL at 22:04

## 2023-10-22 ASSESSMENT — COGNITIVE AND FUNCTIONAL STATUS - GENERAL
STANDING UP FROM CHAIR USING ARMS: A LITTLE
DRESSING REGULAR LOWER BODY CLOTHING: A LOT
STANDING UP FROM CHAIR USING ARMS: A LITTLE
WALKING IN HOSPITAL ROOM: A LOT
HELP NEEDED FOR BATHING: A LOT
CLIMB 3 TO 5 STEPS WITH RAILING: A LOT
DRESSING REGULAR LOWER BODY CLOTHING: A LOT
MOVING TO AND FROM BED TO CHAIR: A LITTLE
MOBILITY SCORE: 16
TURNING FROM BACK TO SIDE WHILE IN FLAT BAD: A LITTLE
HELP NEEDED FOR BATHING: A LITTLE
DRESSING REGULAR UPPER BODY CLOTHING: A LITTLE
MOVING FROM LYING ON BACK TO SITTING ON SIDE OF FLAT BED WITH BEDRAILS: A LITTLE
MOBILITY SCORE: 17
MOVING TO AND FROM BED TO CHAIR: A LITTLE
CLIMB 3 TO 5 STEPS WITH RAILING: A LOT
PERSONAL GROOMING: A LITTLE
PERSONAL GROOMING: A LITTLE
TOILETING: A LITTLE
MOVING FROM LYING ON BACK TO SITTING ON SIDE OF FLAT BED WITH BEDRAILS: A LITTLE
TURNING FROM BACK TO SIDE WHILE IN FLAT BAD: A LITTLE
DAILY ACTIVITIY SCORE: 18
DRESSING REGULAR UPPER BODY CLOTHING: A LITTLE
WALKING IN HOSPITAL ROOM: A LITTLE
TOILETING: A LITTLE
DAILY ACTIVITIY SCORE: 17

## 2023-10-22 ASSESSMENT — PAIN - FUNCTIONAL ASSESSMENT
PAIN_FUNCTIONAL_ASSESSMENT: 0-10

## 2023-10-22 ASSESSMENT — PAIN SCALES - GENERAL
PAINLEVEL_OUTOF10: 7
PAINLEVEL_OUTOF10: 2
PAINLEVEL_OUTOF10: 0 - NO PAIN
PAINLEVEL_OUTOF10: 5 - MODERATE PAIN

## 2023-10-22 ASSESSMENT — ACTIVITIES OF DAILY LIVING (ADL): LACK_OF_TRANSPORTATION: NO

## 2023-10-22 NOTE — PROGRESS NOTES
Edwin Stewart is a 78 y.o. male on day 0 of admission presenting with Low back pain without sciatica, unspecified back pain laterality, unspecified chronicity.      Subjective   Edwin Stewart is a 78 y.o. male with past medical history of scoliosis, spinal stenosis, hypertension, short-bowel syndrome, osteoporosis, asthma/COPD presenting with acute worsening of chronic back pain and weakness.  Patient states over the past 3 months he has had chronic back pain that has been attributed to scoliosis.  Patient had a kyphoplasty 3 weeks ago in [place].  He states that each and every morning his back is worse and now it is at a point where he is unable to ambulate due to severe pain in the low back.  He denies pain radiating down the legs but states that he has weakness with ambulation worsened on the right hip then the left but states he had chronic right hip pains before from traumatic injury.  Patient states he is having loss of bladder and bowel function over the past 3 weeks as well.  He has developed some bedsores that he is treating with topical triple antibiotic ointment and Vaseline.  He has been seeing home physical therapy/Occupational Therapy without improvement.  In the emergency room patient was complaining about increasing low back pain.  ER physician has given him Toradol and up to 12 mg now of morphine without any significant improvement in his back pain and patient now states that he is dizzy and unable to ambulate because he is dizzy and afraid of falling.  Patient attributes to the dizziness and gait instability to the morphine that he received.  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  Significant L2-3 and L4-5 spinal stenosis related to disc bulging ligamentous hypertrophy and facet arthropathy left L2 neuroforaminal narrowing related to  vertebral body hypertrophy and facet arthropathy.  CBC is unremarkable.  BMP significant for sodium of 130.  C-reactive protein is 2.26 sed rate of 58.  Given the patient has subacute incontinence of bowel and bladder function with lower leg weakness concern for spinal stenosis or cauda equina syndrome is raised.  CT showed spinal stenosis.  We will obtain a MRI to make certain that we do not have any type of hematoma formation from patient's kyphoplasty 3 weeks ago.  Patient does not have any fevers or chills or nothing to suspect a spinal abscess.  Neuro consultation as well as orthopedics consultation will be obtained.  MRI to be repeated (last MRI was 3 months ago and did not show significant stenoses).  Patient is to have physical therapy occupational therapy and further evaluation.  Of note patient has early decubiti and changes.      10/21: Patient was sitting on the chair at the bedside when I saw her today.  Pain is now fairly well controlled.  Incontinence of urine fecal matter has been on for years according to him he was seeing a urologist for the urinary incontinence.  He has had an extensive history of scoliosis and had a kyphosis in the recent 3 to 4 weeks.  MRI was done and is still pending report.  Orthopedic surgery has yet to see the patient.   10/22: Patient was seen and examined.  MRI completed and pending results.  Orthopedic/neuro spine surgery not available at [facility].  I will call neurosurgery on call once MRI is resulted.    Objective     Last Recorded Vitals  /73 (BP Location: Right arm, Patient Position: Lying)   Pulse 78   Temp 36.2 °C (97.1 °F) (Temporal)   Resp 18   Wt 67.5 kg (148 lb 14.4 oz)   SpO2 94%   Intake/Output last 3 Shifts:    Intake/Output Summary (Last 24 hours) at 10/22/2023 1119  Last data filed at 10/22/2023 1008  Gross per 24 hour   Intake 440 ml   Output 950 ml   Net -510 ml         Admission Weight  Weight: 55.8 kg (123 lb) (10/20/23 1245)    Daily  Weight  10/20/23 : 67.5 kg (148 lb 14.4 oz)    Image Results  CT lumbar spine wo IV contrast  Narrative: STUDY:  CT Lumbar Spine without IV Contrast; 10/20/2023, 2:41PM.  INDICATION:  Low back pain.  COMPARISON:  CT AP 11/30/2021.  ACCESSION NUMBER(S):  [id number]  ORDERING CLINICIAN:  KAY CUTLER  TECHNIQUE:  CT of the lumbar spine was performed without intravenous  or intrathecal contrast.  Sagittal and coronal reconstructions were  generated.    Automated mA/kV exposure control was utilized and patient examination  was performed in strict accordance with principles of ALARA.  FINDINGS:   Prominent gibbus deformity noted at T12-L1 related to chronic  fractures of the T12 vertebral body and the superior endplate of L1.   T12 kyphoplasty changes are present.  Additional chronic appearing  compression deformities to L3.  Bony structures are diffusely  osteoporotic.  Coronal view also demonstrates levoscoliosis centered  at T12-L1.  Prominent multilevel disc space narrowing.  Mild narrowing of the  spinal canal at L2-3 related to the scoliosis and bony hypertrophy.   Additional L4-5 spinal stenosis due to disc bulging, ligamentous  hypertrophy and facet arthropathy.  Left L2 neural foraminal narrowing  related to vertebral body hypertrophy and facet arthropathy.    The paravertebral soft tissues are within normal limits.  The  visualized abdomen is unremarkable.  Impression: Significant osteopenia.  Chronic compression deformities of T12, L1  and L3. 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 Significant L2-3 and L4-5 spinal stenosis related to disc  bulging, ligamentous hypertrophy and facet arthropathy. Left L2 neural  foraminal narrowing related to vertebral body hypertrophy and facet  arthropathy.  Signed by Gilbert Walton MD  Vascular US lower extremity venous duplex bilateral  Narrative: STUDY:  Bilateral Lower Extremity Venous Doppler Ultrasound; 2:10 PM  INDICATION:  BLE edema  x three days.  COMPARISON:  None available.  ACCESSION NUMBER(S):  [id number]  ORDERING CLINICIAN:  KAY CUTLER  TECHNIQUE:    Real-time grayscale, color, and spectral doppler ultrasound imaging of  the bilateral lower extremity veins was performed.  FINDINGS:   The bilateral common femoral, profunda femoral, femoral and popliteal  veins demonstrated normal compressibility, normal phasic venous flow  and normal response to augmentation.  There is no evidence for  echogenic thrombi.  The visualized deep calf veins are patent.    Impression: No evidence for DVT within the bilateral lower extremities.  Signed by Spencer Sales MD      Physical Exam  Constitutional: Chronically ill looking emaciated elderly  male who is conscious alert afebrile anicteric not pale.  Head: Is atraumatic and normal cephalic  Eyes: PERRLA  Skin: Dry normal color for age and race  Chest: Clear to auscultations bilaterally  CVS: S1-S2 no murmurs rubs or gallops  Abdomen: Flat soft moves respiration bowel sounds present nontender  Extremities: No pitting pedal edema bilaterally  Psychiatric: Normal mood and affect  Relevant Results    Results for orders placed or performed during the hospital encounter of 10/20/23 (from the past 24 hour(s))   CBC and Auto Differential   Result Value Ref Range    WBC 7.5 4.4 - 11.3 x10*3/uL    nRBC 0.0 0.0 - 0.0 /100 WBCs    RBC 3.23 (L) 4.50 - 5.90 x10*6/uL    Hemoglobin 9.6 (L) 13.5 - 17.5 g/dL    Hematocrit 28.9 (L) 41.0 - 52.0 %    MCV 90 80 - 100 fL    MCH 29.7 26.0 - 34.0 pg    MCHC 33.2 32.0 - 36.0 g/dL    RDW 13.7 11.5 - 14.5 %    Platelets 462 (H) 150 - 450 x10*3/uL    MPV 10.6 7.5 - 11.5 fL    Neutrophils % 70.9 40.0 - 80.0 %    Immature Granulocytes %, Automated 0.4 0.0 - 0.9 %    Lymphocytes % 16.2 13.0 - 44.0 %    Monocytes % 11.7 2.0 - 10.0 %    Eosinophils % 0.4 0.0 - 6.0 %    Basophils % 0.4 0.0 - 2.0 %    Neutrophils Absolute 5.33 1.60 - 5.50 x10*3/uL    Immature Granulocytes  Absolute, Automated 0.03 0.00 - 0.50 x10*3/uL    Lymphocytes Absolute 1.22 0.80 - 3.00 x10*3/uL    Monocytes Absolute 0.88 (H) 0.05 - 0.80 x10*3/uL    Eosinophils Absolute 0.03 0.00 - 0.40 x10*3/uL    Basophils Absolute 0.03 0.00 - 0.10 x10*3/uL   Basic metabolic panel   Result Value Ref Range    Glucose 138 (H) 74 - 99 mg/dL    Sodium 132 (L) 136 - 145 mmol/L    Potassium 3.9 3.5 - 5.3 mmol/L    Chloride 99 98 - 107 mmol/L    Bicarbonate 29 21 - 32 mmol/L    Anion Gap 8 (L) 10 - 20 mmol/L    Urea Nitrogen 21 6 - 23 mg/dL    Creatinine 0.86 0.50 - 1.30 mg/dL    eGFR 89 >60 mL/min/1.73m*2    Calcium 8.8 8.6 - 10.3 mg/dL   Magnesium   Result Value Ref Range    Magnesium 1.46 (L) 1.60 - 2.40 mg/dL           Assessment/Plan                  Principal Problem:    Low back pain without sciatica, unspecified back pain laterality, unspecified chronicity  Active Problems:    Personal history of other diseases of the digestive system    Gait disturbance    #1 spinal stenosis with gait abnormality      Concern is raised for the possibility of spinal stenosis with cord compression.  MRI of the lumbar spine and sacrum completed and pending results.   Patient has had incontinence of bladder and bowel function for the last 3 weeks.  History of having kyphoplasty approximately 3 weeks ago.  Rule out spinal cord hemorrhage.  Do not see evidence of abscess patient does not have any fevers chills or leukocytosis.  Patient with known history of scoliosis and compression fractures with moderate spinal stenosis on previous MRI 3 months ago.  But with the changing of neurological symptoms   MRI completed and pending report  We will discuss with neuro spine surgery on-call after MRI is reported.  Neurologist unavailable    #2 incontinence of bladder and bowel      Concern for cord compression is raised.  This is subacute is been going on for over 3 weeks.  MRI of the lumbar spine to be obtained.     #3 gait disturbance      Patient is to  have physical therapy occupational therapy to evaluate.  Patient has decent peripheral strength in the lower extremities but does have proximal weakness right worse than left.  Neurology consultation as above.     #4 history of short-bowel syndrome      Patient reports that he is only has 8 foot of small bowel and has chronic soft stools and diarrhea.  It is making things worse with his incontinence of stool.  Patient has early skin breakdown.      #5 early sacral decubiti      Patient is to have wound care consultation for recommendations besides avoiding soiling contamination of the region and increase mobility.     #6 hypertension       Continue home meds once verified     #7 COPD/reactive airway disease      As needed albuterol continue patient on Advair              Bonaventneal Isaac MD

## 2023-10-23 PROCEDURE — 2500000004 HC RX 250 GENERAL PHARMACY W/ HCPCS (ALT 636 FOR OP/ED): Performed by: INTERNAL MEDICINE

## 2023-10-23 PROCEDURE — 97112 NEUROMUSCULAR REEDUCATION: CPT | Mod: GP,CQ | Performed by: PHYSICAL THERAPY ASSISTANT

## 2023-10-23 PROCEDURE — 97116 GAIT TRAINING THERAPY: CPT | Mod: GP,CQ | Performed by: PHYSICAL THERAPY ASSISTANT

## 2023-10-23 PROCEDURE — 2500000001 HC RX 250 WO HCPCS SELF ADMINISTERED DRUGS (ALT 637 FOR MEDICARE OP): Performed by: INTERNAL MEDICINE

## 2023-10-23 PROCEDURE — 1210000001 HC SEMI-PRIVATE ROOM DAILY

## 2023-10-23 PROCEDURE — 2500000001 HC RX 250 WO HCPCS SELF ADMINISTERED DRUGS (ALT 637 FOR MEDICARE OP): Performed by: PHYSICIAN ASSISTANT

## 2023-10-23 PROCEDURE — 2500000005 HC RX 250 GENERAL PHARMACY W/O HCPCS: Performed by: STUDENT IN AN ORGANIZED HEALTH CARE EDUCATION/TRAINING PROGRAM

## 2023-10-23 PROCEDURE — 97530 THERAPEUTIC ACTIVITIES: CPT | Mod: GO,CO

## 2023-10-23 PROCEDURE — 97535 SELF CARE MNGMENT TRAINING: CPT | Mod: GO,CO

## 2023-10-23 PROCEDURE — 97530 THERAPEUTIC ACTIVITIES: CPT | Mod: GP,CQ | Performed by: PHYSICAL THERAPY ASSISTANT

## 2023-10-23 PROCEDURE — 99233 SBSQ HOSP IP/OBS HIGH 50: CPT | Performed by: INTERNAL MEDICINE

## 2023-10-23 RX ORDER — VIBEGRON 75 MG/1
75 TABLET, FILM COATED ORAL DAILY
COMMUNITY
End: 2024-03-18

## 2023-10-23 RX ADMIN — GABAPENTIN 100 MG: 100 CAPSULE ORAL at 08:59

## 2023-10-23 RX ADMIN — GABAPENTIN 100 MG: 100 CAPSULE ORAL at 22:41

## 2023-10-23 RX ADMIN — LATANOPROST 1 DROP: 50 SOLUTION OPHTHALMIC at 22:42

## 2023-10-23 RX ADMIN — ROSUVASTATIN 5 MG: 10 TABLET, FILM COATED ORAL at 22:41

## 2023-10-23 RX ADMIN — ACETAMINOPHEN 650 MG: 325 TABLET ORAL at 14:28

## 2023-10-23 RX ADMIN — ASPIRIN 81 MG: 81 TABLET, COATED ORAL at 08:59

## 2023-10-23 RX ADMIN — VIBEGRON 75 MG: 75 TABLET, FILM COATED ORAL at 22:48

## 2023-10-23 RX ADMIN — LEVOTHYROXINE SODIUM 75 MCG: 0.07 TABLET ORAL at 05:29

## 2023-10-23 RX ADMIN — LOSARTAN POTASSIUM 50 MG: 50 TABLET, FILM COATED ORAL at 08:59

## 2023-10-23 RX ADMIN — FLUTICASONE FUROATE AND VILANTEROL TRIFENATATE 1 PUFF: 100; 25 POWDER RESPIRATORY (INHALATION) at 21:00

## 2023-10-23 RX ADMIN — MONTELUKAST 10 MG: 10 TABLET, FILM COATED ORAL at 22:41

## 2023-10-23 RX ADMIN — LIDOCAINE 1 PATCH: 4 PATCH TOPICAL at 08:59

## 2023-10-23 SDOH — ECONOMIC STABILITY: INCOME INSECURITY: IN THE PAST 12 MONTHS, HAS THE ELECTRIC, GAS, OIL, OR WATER COMPANY THREATENED TO SHUT OFF SERVICE IN YOUR HOME?: NO

## 2023-10-23 SDOH — ECONOMIC STABILITY: HOUSING INSECURITY
IN THE LAST 12 MONTHS, WAS THERE A TIME WHEN YOU DID NOT HAVE A STEADY PLACE TO SLEEP OR SLEPT IN A SHELTER (INCLUDING NOW)?: NO

## 2023-10-23 SDOH — ECONOMIC STABILITY: FOOD INSECURITY: WITHIN THE PAST 12 MONTHS, THE FOOD YOU BOUGHT JUST DIDN'T LAST AND YOU DIDN'T HAVE MONEY TO GET MORE.: NEVER TRUE

## 2023-10-23 SDOH — ECONOMIC STABILITY: FOOD INSECURITY: WITHIN THE PAST 12 MONTHS, YOU WORRIED THAT YOUR FOOD WOULD RUN OUT BEFORE YOU GOT MONEY TO BUY MORE.: NEVER TRUE

## 2023-10-23 SDOH — ECONOMIC STABILITY: INCOME INSECURITY: IN THE LAST 12 MONTHS, WAS THERE A TIME WHEN YOU WERE NOT ABLE TO PAY THE MORTGAGE OR RENT ON TIME?: NO

## 2023-10-23 SDOH — ECONOMIC STABILITY: INCOME INSECURITY: HOW HARD IS IT FOR YOU TO PAY FOR THE VERY BASICS LIKE FOOD, HOUSING, MEDICAL CARE, AND HEATING?: NOT VERY HARD

## 2023-10-23 SDOH — ECONOMIC STABILITY: TRANSPORTATION INSECURITY
IN THE PAST 12 MONTHS, HAS LACK OF TRANSPORTATION KEPT YOU FROM MEETINGS, WORK, OR FROM GETTING THINGS NEEDED FOR DAILY LIVING?: NO

## 2023-10-23 SDOH — ECONOMIC STABILITY: TRANSPORTATION INSECURITY
IN THE PAST 12 MONTHS, HAS THE LACK OF TRANSPORTATION KEPT YOU FROM MEDICAL APPOINTMENTS OR FROM GETTING MEDICATIONS?: NO

## 2023-10-23 ASSESSMENT — COGNITIVE AND FUNCTIONAL STATUS - GENERAL
TOILETING: A LOT
DRESSING REGULAR UPPER BODY CLOTHING: A LITTLE
CLIMB 3 TO 5 STEPS WITH RAILING: A LOT
MOBILITY SCORE: 16
DRESSING REGULAR LOWER BODY CLOTHING: A LOT
CLIMB 3 TO 5 STEPS WITH RAILING: TOTAL
EATING MEALS: A LITTLE
TURNING FROM BACK TO SIDE WHILE IN FLAT BAD: A LITTLE
TOILETING: A LITTLE
DAILY ACTIVITIY SCORE: 17
MOVING TO AND FROM BED TO CHAIR: A LOT
TURNING FROM BACK TO SIDE WHILE IN FLAT BAD: A LITTLE
MOVING FROM LYING ON BACK TO SITTING ON SIDE OF FLAT BED WITH BEDRAILS: A LITTLE
MOBILITY SCORE: 14
PERSONAL GROOMING: A LITTLE
DRESSING REGULAR LOWER BODY CLOTHING: A LOT
DAILY ACTIVITIY SCORE: 14
WALKING IN HOSPITAL ROOM: A LOT
STANDING UP FROM CHAIR USING ARMS: A LITTLE
HELP NEEDED FOR BATHING: A LOT
MOVING TO AND FROM BED TO CHAIR: A LITTLE
STANDING UP FROM CHAIR USING ARMS: A LITTLE
HELP NEEDED FOR BATHING: A LOT
MOVING FROM LYING ON BACK TO SITTING ON SIDE OF FLAT BED WITH BEDRAILS: A LITTLE
DRESSING REGULAR UPPER BODY CLOTHING: A LOT
PERSONAL GROOMING: A LITTLE
WALKING IN HOSPITAL ROOM: A LOT

## 2023-10-23 ASSESSMENT — PAIN SCALES - GENERAL
PAINLEVEL_OUTOF10: 8
PAINLEVEL_OUTOF10: 3
PAINLEVEL_OUTOF10: 5 - MODERATE PAIN
PAINLEVEL_OUTOF10: 0 - NO PAIN
PAINLEVEL_OUTOF10: 3
PAINLEVEL_OUTOF10: 5 - MODERATE PAIN

## 2023-10-23 ASSESSMENT — PAIN - FUNCTIONAL ASSESSMENT
PAIN_FUNCTIONAL_ASSESSMENT: 0-10

## 2023-10-23 ASSESSMENT — ACTIVITIES OF DAILY LIVING (ADL): HOME_MANAGEMENT_TIME_ENTRY: 12

## 2023-10-23 NOTE — PROGRESS NOTES
Edwin Stewart is a 78 y.o. male on day 1 of admission presenting with Low back pain without sciatica, unspecified back pain laterality, unspecified chronicity.      Subjective   Edwin Stewart is a 78 y.o. male with past medical history of scoliosis, spinal stenosis, hypertension, short-bowel syndrome, osteoporosis, asthma/COPD presenting with acute worsening of chronic back pain and weakness.  Patient states over the past 3 months he has had chronic back pain that has been attributed to scoliosis.  Patient had a kyphoplasty 3 weeks ago in [place].  He states that each and every morning his back is worse and now it is at a point where he is unable to ambulate due to severe pain in the low back.  He denies pain radiating down the legs but states that he has weakness with ambulation worsened on the right hip then the left but states he had chronic right hip pains before from traumatic injury.  Patient states he is having loss of bladder and bowel function over the past 3 weeks as well.  He has developed some bedsores that he is treating with topical triple antibiotic ointment and Vaseline.  He has been seeing home physical therapy/Occupational Therapy without improvement.  In the emergency room patient was complaining about increasing low back pain.  ER physician has given him Toradol and up to 12 mg now of morphine without any significant improvement in his back pain and patient now states that he is dizzy and unable to ambulate because he is dizzy and afraid of falling.  Patient attributes to the dizziness and gait instability to the morphine that he received.  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  Significant L2-3 and L4-5 spinal stenosis related to disc bulging ligamentous hypertrophy and facet arthropathy left L2 neuroforaminal narrowing related to  vertebral body hypertrophy and facet arthropathy.  CBC is unremarkable.  BMP significant for sodium of 130.  C-reactive protein is 2.26 sed rate of 58.  Given the patient has subacute incontinence of bowel and bladder function with lower leg weakness concern for spinal stenosis or cauda equina syndrome is raised.  CT showed spinal stenosis.  We will obtain a MRI to make certain that we do not have any type of hematoma formation from patient's kyphoplasty 3 weeks ago.  Patient does not have any fevers or chills or nothing to suspect a spinal abscess.  Neuro consultation as well as orthopedics consultation will be obtained.  MRI to be repeated (last MRI was 3 months ago and did not show significant stenoses).  Patient is to have physical therapy occupational therapy and further evaluation.  Of note patient has early decubiti and changes.      10/21: Patient was sitting on the chair at the bedside when I saw her today.  Pain is now fairly well controlled.  Incontinence of urine fecal matter has been on for years according to him he was seeing a urologist for the urinary incontinence.  He has had an extensive history of scoliosis and had a kyphosis in the recent 3 to 4 weeks.  MRI was done and is still pending report.  Orthopedic surgery has yet to see the patient.   10/22: Patient was seen and examined.  MRI completed and pending results.  Orthopedic/neuro spine surgery not available at [facility].  I will call neurosurgery on call once MRI is resulted.  10/23: Patient was seen and examined.  MRI resulted and reviewed.  No significant acute findings requiring intervention at this time.  I have discussed this with orthopedic and neuro spine surgery at [facility] who recommend outpatient follow-up with neuro spine surgery.  Patient agrees for discharge to extended-care facility.  PT/OT recommendation.  Currently awaiting preauthorization for placement in an extended care facility.        Objective     Last Recorded  Vitals  /76 (BP Location: Left arm)   Pulse 79   Temp 36.7 °C (98.1 °F) (Temporal)   Resp 18   Wt 67.5 kg (148 lb 14.4 oz)   SpO2 96%   Intake/Output last 3 Shifts:    Intake/Output Summary (Last 24 hours) at 10/23/2023 1252  Last data filed at 10/23/2023 0907  Gross per 24 hour   Intake 890 ml   Output 801 ml   Net 89 ml         Admission Weight  Weight: 55.8 kg (123 lb) (10/20/23 1245)    Daily Weight  10/20/23 : 67.5 kg (148 lb 14.4 oz)    Image Results  MR lumbar spine wo IV contrast  Narrative: Interpreted By:  Prasanth Thakur,   STUDY:  MRI of the lumbar spine without IV contrast;  10/21/2023 9:59 am      INDICATION:  Signs/Symptoms:Spinal stenosis with incontinence of bladder and  bowel.  Kyphoplasty 3 weeks ago in [place]. Unable to ambulate due  to severe pain. Subacute incontinence of bowel and bladder function.      COMPARISON:  10/20/2023 CT and abdominal CT 06/15/2021      ACCESSION NUMBER(S):  [id number]      ORDERING CLINICIAN:  CECI SHAW      TECHNIQUE:  Sagittal and axial STIR and T1-weighted MRI images of the lumbar  spine were acquired using a spondylolysis protocol.  No contrast was  administered.      FINDINGS:  There are 5 lumbar type vertebrae.      The spine curves approximately 30ï¿½ convex to the left at T12-L1 and  to the right at L4-5. (The curvatures were approximately 15-20  degrees in 2021)      L2 has 5 mm retrolisthesis on L3 with 5 mm anterolisthesis L4 on L5  and L5 3 mm on S1, all related to the scoliosis.      Kyphoplasty has been performed at T12.      The marrow has mottled signal intensity on all imaging sequences with  more focal round areas possibly representing atypical hemangiomas in  the central L4 and L5 vertebral bodies. Smaller similar foci are  scattered through the remaining spine as well. Underlying marrow  tumors are not excluded; no soft tissue mass extending from the  vertebrae is noted.      The tip of the spinal cord ends normally at L1 with no  "compression or  intrinsic abnormalities of the spinal cord noted.      The T12 vertebral body has 80% loss of height with bone cement from  kyphoplasty centrally and anteriorly. The bone cement is surrounded  by a band of marrow edema. The posterior wall of the vertebral body  is buckled with less than 20% stenosis of the canal.      The L1 vertebral body has diffuse marrow edema with compression  anteriorly and superiorly with 20-30% loss of height.      The L2 vertebral body has chronic 60-70% loss of height mostly  centrally and anteriorly.      The L3 vertebral body has diffuse marrow edema extending into the  pedicles bilaterally with 20% loss of height superiorly. The  posterior wall of the vertebral body is minimally buckled with no  significant canal stenosis.      The spinous processes are \"kissing\" from L1-2 through L4-5 with  severe degenerative changes at L3-4.      T10-11: No stenosis is noted on the sagittal images.      T11-12: No stenosis is noted.      T12-L1: The right neural foramen is moderately to severely stenosed  by the scoliosis, endplate spurring and facet hypertrophy with mild  to moderate stenosis of the right lateral recess.      L1-2: No stenosis is noted.      L2-3: The lateral recesses are moderately to severely stenosed with  moderate stenosis of the central canal secondary to the scoliosis,  facet hypertrophy, ligament thickening and a retrolisthesis. The  neural foramina are mildly to moderately stenosed worse on the right.      L3-4: The left neural foramen is moderately stenosed secondary to the  scoliosis. The lateral recesses and spinal canal are mildly stenosed  by ligament thickening and disc bulge.      L4-5: The neural foramina are severely stenosed secondary to the  scoliosis, facet hypertrophy and disc bulge. The lateral recesses and  spinal canal are moderately stenosed.      L5-S1: The lateral recesses are mildly stenosed by facet hypertrophy  and ligament thickening " with mild foraminal stenoses secondary to  endplate spurring as well.      The right kidney has simple cysts up to 2.7 cm diameter.          Impression: 1. The marrow has diffuse mottling with more focal areas of  abnormality as described; the findings may be secondary to benign  processes such as atypical hemangiomas and severe osteoporosis.  Underlying marrow tumor cannot be excluded however. No soft tissue  masses suggestive of tumor are noted      2. Multiple compression injuries are present similar to the  10/20/2023 spine CT; these are new from an abdominal CT 06/15/2021.  The scoliosis has worsened as well.      3. The marrow edema at L1 is consistent with acute/subacute  compression injury with similar edema in the L3 vertebral body.      4. Multilevel stenoses as noted with the greatest degrees of stenosis  at L2-3 and L4-5.      I personally reviewed the images/study and I agree with the findings  as stated. This study was interpreted at [place], [place].      MACRO:  None      Signed by: Prasanth Thakur 10/23/2023 9:17 AM  Dictation workstation:   [id number]      Physical Exam  Constitutional: Chronically ill looking emaciated elderly  male who is conscious alert afebrile anicteric not pale.  Head: Is atraumatic and normal cephalic  Eyes: PERRLA  Skin: Dry normal color for age and race  Chest: Clear to auscultations bilaterally  CVS: S1-S2 no murmurs rubs or gallops  Abdomen: Flat soft moves respiration bowel sounds present nontender  Extremities: No pitting pedal edema bilaterally  Psychiatric: Normal mood and affect  Relevant Results    No results found for this or any previous visit (from the past 24 hour(s)).          Assessment/Plan                  Principal Problem:    Low back pain without sciatica, unspecified back pain laterality, unspecified chronicity  Active Problems:    Personal history of other diseases of the digestive system    Gait  disturbance    #1 spinal stenosis with gait abnormality      Concern is raised for the possibility of spinal stenosis with cord compression.  MRI of the lumbar spine and sacrum completed and pending results.   Patient has had incontinence of bladder and bowel function for the last 3 weeks.  History of having kyphoplasty approximately 3 weeks ago.  Rule out spinal cord hemorrhage.  Do not see evidence of abscess patient does not have any fevers chills or leukocytosis.  Patient with known history of scoliosis and compression fractures with moderate spinal stenosis on previous MRI 3 months ago.  But with the changing of neurological symptoms   MRI completed and pending report  We will discuss with neuro spine surgery on-call after MRI is reported.  Neurologist unavailable    #2 incontinence of bladder and bowel      Concern for cord compression is raised.  This is subacute is been going on for over 3 weeks.  MRI of the lumbar spine to be obtained.     #3 gait disturbance      Patient is to have physical therapy occupational therapy to evaluate.  Patient has decent peripheral strength in the lower extremities but does have proximal weakness right worse than left.  Neurology consultation as above.     #4 history of short-bowel syndrome      Patient reports that he is only has 8 foot of small bowel and has chronic soft stools and diarrhea.  It is making things worse with his incontinence of stool.  Patient has early skin breakdown.      #5 early sacral decubiti      Patient is to have wound care consultation for recommendations besides avoiding soiling contamination of the region and increase mobility.     #6 hypertension       Continue home meds once verified     #7 COPD/reactive airway disease      As needed albuterol continue patient on Advair              Bonaventure RJ Isaac MD

## 2023-10-23 NOTE — PROGRESS NOTES
Physical Therapy    Physical Therapy Treatment    Patient Name: Edwin Stewart  MRN: [id number]  Today's Date: 10/23/2023  Time Calculation  Start Time: 1053  Stop Time: 1131  Time Calculation (min): 38 min       Assessment/Plan   PT Assessment  PT Assessment Results: Decreased strength, Decreased endurance, Impaired balance, Decreased mobility, Decreased skin integrity  Rehab Prognosis: Fair  Barriers to Discharge: chronic pain, decreased support at home  Evaluation/Treatment Tolerance: Patient limited by pain, Patient limited by fatigue  Medical Staff Made Aware: Yes  Strengths: Attitude of self, Support of extended family/friends  Barriers to Participation: Co morbidities  End of Session Communication: Bedside nurse  Assessment Comment: pt demonstrated good progress towards goals. He  required 1 assist this session with all mobility. pt was educated on safety and technique to decrease fall risk.  End of Session Patient Position: Up in chair, Alarm on     PT Plan  Treatment/Interventions: Bed mobility, Transfer training, Gait training, Balance training, Strengthening, Endurance training, Range of motion, Therapeutic exercise, Therapeutic activity, Home exercise program, Positioning  PT Plan: Skilled PT  PT Frequency: 3 times per week  PT Discharge Recommendations: Moderate intensity level of continued care  PT Recommended Transfer Status: Assist x2      General Visit Information:   PT  Visit  PT Received On: 10/23/23  Response to Previous Treatment: Patient with no complaints from previous session.  General  Reason for Referral: impaired mobility  Referred By: iker  Past Medical History Relevant to Rehab: scoliosis, spinal stenosis, HTN, short bowel syndrome, osteoporosis, asthma, COPD, kyphoplasty, hx of R hip surgery  Patient Position Received: Bed, 2 rail up  Preferred Learning Style: verbal  General Comment: pt agreeable to treatment. pt perseverating on mobility and pain  levels.    Precautions:  Precautions  Precautions Comment: Fall risk, recent kyphoplasty with hx of chronic backpain and spinal deformities at T12-L3 (spinal precautions for comfort)         Pain:  Pain Assessment  Pain Assessment: 0-10  Pain Score: 3  Pain Type: Chronic pain  Pain Location: Back            Treatments:  Balance/Neuromuscular Re-Education  Balance/Neuromuscular Re-Education Activity Performed: Yes  Balance/Neuromuscular Re-Education Activity 1: pt tolerated sitting EOB x 12 minutes with SBA and no LOB. pt is able to perform proactive and reactive activities. pt stood multiple attempts 1-2 min with MINAx1 and walker.. pt with no overt LOB.    Bed Mobility  Bed Mobility: Yes  Bed Mobility 1  Bed Mobility 1: Supine to sitting  Level of Assistance 1: Moderate assistance  Bed Mobility Comments 1: pt required cues for improve  technique and hand placement to transition trunk. pt able to get LES to EOB.    Ambulation/Gait Training  Ambulation/Gait Training Performed: Yes  Ambulation/Gait Training 1  Surface 1: Level tile  Device 1: Rolling walker  Assistance 1: Minimum assistance  Quality of Gait 1: Narrow base of support  Comments/Distance (ft) 1: 10'x1 and 4 feet x1 with cues for safety and posture.  Transfers  Transfer: Yes  Transfer 1  Technique 1: Sit to stand, Stand to sit  Transfer Device 1: Walker  Transfer Level of Assistance 1: Minimum assistance  Trials/Comments 1: cues to not pull up from walker and to control descent into seated.  Transfers 2  Technique 2: Stand pivot  Transfer Device 2: Walker  Transfer Level of Assistance 2: Minimum assistance, Maximum verbal cues  Trials/Comments 2: cues for walker safety and technique.    Outcome Measures:  [facility] Basic Mobility  Turning from your back to your side while in a flat bed without using bedrails: A little  Moving from lying on your back to sitting on the side of a flat bed without using bedrails: A little  Moving to and from bed to chair  (including a wheelchair): A lot  Standing up from a chair using your arms (e.g. wheelchair or bedside chair): A little  To walk in hospital room: A lot  Climbing 3-5 steps with railing: Total  Basic Mobility - Total Score: 14    Education Documentation  Precautions, taught by Radha Ward PTA at 10/23/2023 11:55 AM.  Learner: Patient  Readiness: Acceptance  Method: Explanation  Response: Verbalizes Understanding, Needs Reinforcement    Body Mechanics, taught by Radha Ward PTA at 10/23/2023 11:55 AM.  Learner: Patient  Readiness: Acceptance  Method: Explanation  Response: Verbalizes Understanding, Needs Reinforcement    Home Exercise Program, taught by Radha Ward PTA at 10/23/2023 11:55 AM.  Learner: Patient  Readiness: Acceptance  Method: Explanation  Response: Verbalizes Understanding, Needs Reinforcement    Mobility Training, taught by Radha Ward PTA at 10/23/2023 11:55 AM.  Learner: Patient  Readiness: Acceptance  Method: Explanation  Response: Verbalizes Understanding, Needs Reinforcement    Education Comments  No comments found.           Encounter Problems       Encounter Problems (Active)       Mobility       STG - Patient will ambulate up to at least 100' with LRD with CGA x1 and minimal complaints of pain (<4/10) and no LOB to increase indep in room/facility (Progressing)       Start:  10/21/23    Expected End:  11/04/23               Transfers       STG - Patient will perform bed mobility (supine<>sit) with Min A x1 using logrolling technique as able to protect spine to increase indep with getting in/out of bed safely (Progressing)       Start:  10/21/23    Expected End:  11/04/23            STG- Patient will be able to perform STS and SPT transfers with LRD from bed/chair with CGA x1 to increase indep in room (Progressing)       Start:  10/21/23    Expected End:  11/04/23

## 2023-10-23 NOTE — PROGRESS NOTES
"Nutrition Assessment Note  RD screen:  [facility]     History:  PMH includes scoliosis, spinal stenosis, hypertension, short-bowel syndrome, osteoporosis, asthma/COPD    HPI:  Patient presents with acute worsening of chronic back pain and weakness.     10/23:  Patient awake, alert at time of visit.  Patient with good appetite however with limited food options d/t history of short-bowel syndrome.  Patient reports abdominal surgery in 2020, had diet education by RD at that time for Short bowel, provided additional handout along with contact information for further assistance.  Patient does consume Ensure supplements but is only able to tolerate 1/2-1 total supplement daily.  Reviewed menu to encourage optimal oral intake.      Food and Nutrient History: 4-6 smal meals/snacks per day, low fiber  Vitamin/Herbal Supplement Use: pedialyte and 1/2-1 Ensure supplement per day.    Current Diet: Adult diet Regular. 3 gm fiber added to each meal, Ensure TID   Average meal Intake during admission: %, small meals     Nutrition Related Findings:   Pt reports early satiety and diarrhea.  .  Peripheral Vascular (WDL): Within Defined Limits.  .   Wound Type: pressure injury:left buttocks  (nursing/wound notes provide further details)    Food allergies: NKFA. is allergic to tamsulosin, adhesive tape-silicones, bee pollen, brimonidine, cat dander, dorzolamide-timolol, hydrocortisone, oxybutynin, perfume, trichophyton mentagrophytes allergenic extract, aluminum chlorohydrate, beta-blockers (beta-adrenergic blocking agts), and cortisone.  Patient denies allergy or intolerance to gluten, removed from allergy list per patient request.    meds/labs reviewed.      Anthropometrics:  Height: 152.4 cm (5')  Weight: 67.5 kg (148 lb 14.4 oz)  BMI (Calculated): 29.08  IBW/kg (Dietitian Calculated): 48.1 kg       Weight History / % Weight Change: History of scoliosis, height went rom 67\" to 60\" and weight went from 140 lb to 123lb.    Wt Readings " from Last 10 Encounters:   10/20/23 67.5 kg (148 lb 14.4 oz)   04/11/23 63.5 kg (140 lb)   03/21/23 61.7 kg (136 lb)   12/21/22 63 kg (139 lb)   12/08/22 63.2 kg (139 lb 4.8 oz)   11/09/22 63.5 kg (140 lb)   09/23/22 65 kg (143 lb 4.8 oz)   07/06/22 63.5 kg (140 lb)   06/09/22 63.6 kg (140 lb 2 oz)   04/27/22 64.4 kg (142 lb)       Labs:  Results from last 7 days   Lab Units 10/22/23  0551 10/21/23  0449 10/20/23  1349   GLUCOSE mg/dL 138* 92 88   SODIUM mmol/L 132* 131* 130*   POTASSIUM mmol/L 3.9 4.3 3.9   CHLORIDE mmol/L 99 97* 94*   CO2 mmol/L 29 30 30   BUN mg/dL 21 17 15   CREATININE mg/dL 0.86 0.85 0.73   EGFR mL/min/1.73m*2 89 89 >90   CALCIUM mg/dL 8.8 8.8 9.9   MAGNESIUM mg/dL 1.46*  --   --      Lab Results   Component Value Date    HGBA1C 5.7 (A) 12/01/2021           Energy Needs:  Total Energy Estimated Needs (kCal):  (6424-1934)  Total Estimated Energy Need per Day (kCal/kg):  (25, CBW)    Total Protein Estimated Needs (g):  (65-80)  Total Protein Estimated Needs (g/kg):  (1-1.2, CBW)    Total Fluid Estimated Needs (mL):  (1 mL, kcal or per physician)    Nutrition Focused Physical Findings:  Subcutaneous Fat Loss  Orbital Fat Pads: Mild-Moderate (slight dark circles and slight hollowing)  Buccal Fat Pads: Mild-Moderate (flat cheeks, minimal bounce)  Triceps: Severe (negligible fat tissue)    Muscle Wasting  Temporalis: Mild-Moderate (slight depression)  Pectoralis (Clavicular Region): Severe (protruding prominent clavicle)  Deltoid/Trapezius: Severe (squared shoulders, acromion process prominent)      Diagnosis   Malnutrition Diagnosis  Patient has Malnutrition Diagnosis: No    Patient has Nutrition Diagnosis: Yes  Diagnosis Status (1): New  Nutrition Diagnosis 1: Predicted inadequate energy intake  Related to (1): altered GI function secondary to short bowel syndrome  As Evidenced by (1): limited food options, weight loss       Interventions/Recommendations   Individualized Nutrition Prescription  Provided for : Continue regular diet, Offer Ensure as patient tolerates      Monitoring and Evaluation   Will continue to monitor and evaluate: Food and Nutrition Intake, Supplement Intake, GI Status, GI Symptoms, Skin, Weight, and Other labs        Follow Up  Time Spent (min): 60 minutes  Last Date of Nutrition Visit: 10/23/23  Nutrition Follow-Up Needed?: Dietitian to reassess per policy  Follow up Comment: 10/26-10/27

## 2023-10-23 NOTE — CARE PLAN
The patient's goals for the shift include  maintain pt safety overnight.    The clinical goals for the shift include pain < 3/10 this shift    Over the shift, the patient made progress toward goals.

## 2023-10-23 NOTE — CARE PLAN
The patient's goals for the shift include      The clinical goals for the shift include pt to state pain 3/10 or less this shift.

## 2023-10-23 NOTE — PROGRESS NOTES
Met with patient, he is from home, normally independent in his own care. He had recent hyphoplasty in [place] 3 weeks ago, he was at home doing fairly well up until admission, he is now having difficulty ambulating and moving. PT OT is pending. Patient already aware he wants to go to [facility]. Will have referral sent.

## 2023-10-23 NOTE — PROGRESS NOTES
Occupational Therapy    OT Treatment    Patient Name: Edwin Stewart  MRN: [id number]  Today's Date: 10/23/2023  Time Calculation  Start Time: 1105  Stop Time: 1129  Time Calculation (min): 24 min         Assessment:  End of Session Communication: Bedside nurse  End of Session Patient Position: Up in chair, Alarm on (call light in reach)       Plan:        Subjective     Current Problem:  1. Low back pain without sciatica, unspecified back pain laterality, unspecified chronicity            General:     General Comment:  (pt. agreeable to OT he perseverated on pain, his limitatiions due to pain needed redirected to stay on task He is MOD A for LE ADLs  familar with A.E.to increase independence.)    Vital Signs:       Pain:  Pain Assessment  Pain Assessment: 0-10  Pain Score: 3  Pain Type: Chronic pain  Pain Location: Back  Objective  Focus of treatment to increase transfers and ADLS                    LE Dressing  LE Dressing: Yes  Adult Briefs Level of Assistance: Moderate assistance  LE Dressing Comments: pt. familiar withA.E. states he has some at home but was colin to cross leg tech prior to hospitalization. Declined to demonstrate todaty but able to lift LE into brief  and  assist with managing over hips  Toileting  Toileting Level of Assistance: Moderate assistance (poor effort to attempt cleaning self  with prompting he was colin to clean groin but required assist with buttocks.)  Where Assessed:  (standing at EOB)  Toileting Comments:  (pt. lacks confidence with reaching backward.)    Functional Standing Tolerance:  Time: 1-2 mins  Activity: standing during toielting  Functional Standing Tolerance Comments: no loss of balancet just lack confidence.    Bed Mobility/Transfers: Bed Mobility  Bed Mobility: No (pt up with PTA on arrival.)  Transfers  Transfer: Yes  Transfer 1  Technique 1: Sit to stand  Transfer Device 1: Walker  Transfer Level of Assistance 1: Minimum assistance  Trials/Comments 1: cues for hand  placement  Transfers 2  Technique 2: Sit pivot  Transfer Device 2: Walker  Transfer Level of Assistance 2: Minimum assistance  Trials/Comments 2:  (cues for hand placement and FWW management patient tend to have difficutly navigating FWW) pt. Was colin to increase functional mobility from bed to chair today.                Therapy/Activity:          Balance/Neuromuscular Re-Education  Balance/Neuromuscular Re-Education Activity Performed: Yes  Balance/Neuromuscular Re-Education Activity 1: standing up to 1-2 mins multiple attempts with toileing and LE dressing  need MIN  to MOD A x1    Strength:       Other Activity:       Outcome Measures:[facility] Daily Activity  Putting on and taking off regular lower body clothing: A lot  Bathing (including washing, rinsing, drying): A lot  Putting on and taking off regular upper body clothing: A lot  Toileting, which includes using toilet, bedpan or urinal: A lot  Taking care of personal grooming such as brushing teeth: A little  Eating Meals: A little  Daily Activity - Total Score: 14  Education Documentation  Body Mechanics, taught by LINDA Simon at 10/23/2023  1:19 PM.  Learner: Patient  Readiness: Acceptance  Method: Explanation  Response: Verbalizes Understanding, Demonstrated Understanding, Needs Reinforcement    Precautions, taught by LINDA Simon at 10/23/2023  1:19 PM.  Learner: Patient  Readiness: Acceptance  Method: Explanation  Response: Verbalizes Understanding, Demonstrated Understanding, Needs Reinforcement    ADL Training, taught by LINDA Simon at 10/23/2023  1:19 PM.  Learner: Patient  Readiness: Acceptance  Method: Explanation  Response: Verbalizes Understanding, Demonstrated Understanding, Needs Reinforcement    Education Comments  No comments found.        EDUCATION:  Education  Individual(s) Educated: Patient  Education Provided: Diagnosis & Precautions, Symptom management, Fall precautons, Risk and benefits of OT discussed with  patient or other, POC discussed and agreed upon  Risk and Benefits Discussed with Patient/Caregiver/Other: yes  Patient/Caregiver Demonstrated Understanding: yes  Plan of Care Discussed and Agreed Upon: yes  Patient Response to Education: Patient/Caregiver Verbalized Understanding of Information    Goals:  Encounter Problems       Encounter Problems (Active)       Mobility       STG - Patient will ambulate up to at least 100' with LRD with CGA x1 and minimal complaints of pain (<4/10) and no LOB to increase indep in room/facility (Progressing)       Start:  10/21/23    Expected End:  11/04/23               OT Goals       Pt will complete ADL transfers with CGA using LRAD.  (Progressing)       Start:  10/21/23    Expected End:  11/04/23            Pt will complete bed mobility tasks with MIN A as needed to complete ADL tasks.  (Progressing)       Start:  10/21/23    Expected End:  11/04/23            pt will complete LBD tasks with MIN A using AE/DME as needed.  (Progressing)       Start:  10/21/23    Expected End:  11/04/23            Pt completed toileting routine with MIN A.  (Progressing)       Start:  10/21/23    Expected End:  11/04/23            Pt will demonstrate F+ functional activity tolerance during ADL tasks.  (Progressing)       Start:  10/21/23    Expected End:  11/04/23

## 2023-10-23 NOTE — PROGRESS NOTES
Edwin Stewart is a 78 y.o. male on day 1 of admission presenting with Low back pain without sciatica, unspecified back pain laterality, unspecified chronicity.    Provided pt with list of SNFs based on pt location and insurance. Pt chose [facility]. Requested referral be sent. IMM given.     Wanda Turk RN

## 2023-10-24 PROCEDURE — 1210000001 HC SEMI-PRIVATE ROOM DAILY

## 2023-10-24 PROCEDURE — 2500000001 HC RX 250 WO HCPCS SELF ADMINISTERED DRUGS (ALT 637 FOR MEDICARE OP): Performed by: PHYSICIAN ASSISTANT

## 2023-10-24 PROCEDURE — 2500000001 HC RX 250 WO HCPCS SELF ADMINISTERED DRUGS (ALT 637 FOR MEDICARE OP): Performed by: INTERNAL MEDICINE

## 2023-10-24 PROCEDURE — 97112 NEUROMUSCULAR REEDUCATION: CPT | Mod: GO,CO

## 2023-10-24 PROCEDURE — 97116 GAIT TRAINING THERAPY: CPT | Mod: GP,CQ | Performed by: PHYSICAL THERAPY ASSISTANT

## 2023-10-24 PROCEDURE — 2500000004 HC RX 250 GENERAL PHARMACY W/ HCPCS (ALT 636 FOR OP/ED): Performed by: INTERNAL MEDICINE

## 2023-10-24 PROCEDURE — 97535 SELF CARE MNGMENT TRAINING: CPT | Mod: GO,CO

## 2023-10-24 PROCEDURE — 97112 NEUROMUSCULAR REEDUCATION: CPT | Mod: GP,CQ | Performed by: PHYSICAL THERAPY ASSISTANT

## 2023-10-24 PROCEDURE — 99233 SBSQ HOSP IP/OBS HIGH 50: CPT | Performed by: INTERNAL MEDICINE

## 2023-10-24 PROCEDURE — 2500000005 HC RX 250 GENERAL PHARMACY W/O HCPCS: Performed by: STUDENT IN AN ORGANIZED HEALTH CARE EDUCATION/TRAINING PROGRAM

## 2023-10-24 RX ADMIN — FLUTICASONE FUROATE AND VILANTEROL TRIFENATATE 1 PUFF: 100; 25 POWDER RESPIRATORY (INHALATION) at 20:23

## 2023-10-24 RX ADMIN — ROSUVASTATIN 5 MG: 10 TABLET, FILM COATED ORAL at 20:21

## 2023-10-24 RX ADMIN — GABAPENTIN 100 MG: 100 CAPSULE ORAL at 20:21

## 2023-10-24 RX ADMIN — LOSARTAN POTASSIUM 50 MG: 50 TABLET, FILM COATED ORAL at 09:18

## 2023-10-24 RX ADMIN — LIDOCAINE 1 PATCH: 4 PATCH TOPICAL at 09:18

## 2023-10-24 RX ADMIN — MONTELUKAST 10 MG: 10 TABLET, FILM COATED ORAL at 20:21

## 2023-10-24 RX ADMIN — VIBEGRON 75 MG: 75 TABLET, FILM COATED ORAL at 20:22

## 2023-10-24 RX ADMIN — ASPIRIN 81 MG: 81 TABLET, COATED ORAL at 09:18

## 2023-10-24 RX ADMIN — GABAPENTIN 100 MG: 100 CAPSULE ORAL at 09:18

## 2023-10-24 RX ADMIN — LATANOPROST 1 DROP: 50 SOLUTION OPHTHALMIC at 20:22

## 2023-10-24 RX ADMIN — LEVOTHYROXINE SODIUM 75 MCG: 0.07 TABLET ORAL at 06:19

## 2023-10-24 ASSESSMENT — COGNITIVE AND FUNCTIONAL STATUS - GENERAL
MOBILITY SCORE: 15
MOBILITY SCORE: 17
MOVING TO AND FROM BED TO CHAIR: A LOT
HELP NEEDED FOR BATHING: A LITTLE
CLIMB 3 TO 5 STEPS WITH RAILING: TOTAL
WALKING IN HOSPITAL ROOM: A LOT
TURNING FROM BACK TO SIDE WHILE IN FLAT BAD: A LITTLE
DRESSING REGULAR UPPER BODY CLOTHING: A LITTLE
DRESSING REGULAR LOWER BODY CLOTHING: A LITTLE
STANDING UP FROM CHAIR USING ARMS: A LITTLE
HELP NEEDED FOR BATHING: A LITTLE
DAILY ACTIVITIY SCORE: 18
TOILETING: A LOT
DRESSING REGULAR UPPER BODY CLOTHING: A LITTLE
STANDING UP FROM CHAIR USING ARMS: A LITTLE
CLIMB 3 TO 5 STEPS WITH RAILING: TOTAL
DAILY ACTIVITIY SCORE: 20
MOVING TO AND FROM BED TO CHAIR: A LITTLE
TOILETING: A LITTLE
WALKING IN HOSPITAL ROOM: A LOT
PERSONAL GROOMING: A LITTLE
DRESSING REGULAR LOWER BODY CLOTHING: A LITTLE

## 2023-10-24 ASSESSMENT — ACTIVITIES OF DAILY LIVING (ADL)
BATHING_LEVEL_OF_ASSISTANCE: CONTACT GUARD
BATHING_WHERE_ASSESSED: EDGE OF BED
HOME_MANAGEMENT_TIME_ENTRY: 13

## 2023-10-24 ASSESSMENT — PAIN SCALES - GENERAL
PAINLEVEL_OUTOF10: 0 - NO PAIN

## 2023-10-24 ASSESSMENT — PAIN - FUNCTIONAL ASSESSMENT
PAIN_FUNCTIONAL_ASSESSMENT: 0-10

## 2023-10-24 NOTE — PROGRESS NOTES
Edwin Stewart is a 78 y.o. male on day 2 of admission presenting with Low back pain without sciatica, unspecified back pain laterality, unspecified chronicity.      Subjective   Edwin Stewart is a 78 y.o. male with past medical history of scoliosis, spinal stenosis, hypertension, short-bowel syndrome, osteoporosis, asthma/COPD presenting with acute worsening of chronic back pain and weakness.  Patient states over the past 3 months he has had chronic back pain that has been attributed to scoliosis.  Patient had a kyphoplasty 3 weeks ago in [place].  He states that each and every morning his back is worse and now it is at a point where he is unable to ambulate due to severe pain in the low back.  He denies pain radiating down the legs but states that he has weakness with ambulation worsened on the right hip then the left but states he had chronic right hip pains before from traumatic injury.  Patient states he is having loss of bladder and bowel function over the past 3 weeks as well.  He has developed some bedsores that he is treating with topical triple antibiotic ointment and Vaseline.  He has been seeing home physical therapy/Occupational Therapy without improvement.  In the emergency room patient was complaining about increasing low back pain.  ER physician has given him Toradol and up to 12 mg now of morphine without any significant improvement in his back pain and patient now states that he is dizzy and unable to ambulate because he is dizzy and afraid of falling.  Patient attributes to the dizziness and gait instability to the morphine that he received.  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  Significant L2-3 and L4-5 spinal stenosis related to disc bulging ligamentous hypertrophy and facet arthropathy left L2 neuroforaminal narrowing related to  vertebral body hypertrophy and facet arthropathy.  CBC is unremarkable.  BMP significant for sodium of 130.  C-reactive protein is 2.26 sed rate of 58.  Given the patient has subacute incontinence of bowel and bladder function with lower leg weakness concern for spinal stenosis or cauda equina syndrome is raised.  CT showed spinal stenosis.  We will obtain a MRI to make certain that we do not have any type of hematoma formation from patient's kyphoplasty 3 weeks ago.  Patient does not have any fevers or chills or nothing to suspect a spinal abscess.  Neuro consultation as well as orthopedics consultation will be obtained.  MRI to be repeated (last MRI was 3 months ago and did not show significant stenoses).  Patient is to have physical therapy occupational therapy and further evaluation.  Of note patient has early decubiti and changes.      10/21: Patient was sitting on the chair at the bedside when I saw her today.  Pain is now fairly well controlled.  Incontinence of urine fecal matter has been on for years according to him he was seeing a urologist for the urinary incontinence.  He has had an extensive history of scoliosis and had a kyphosis in the recent 3 to 4 weeks.  MRI was done and is still pending report.  Orthopedic surgery has yet to see the patient.   10/22: Patient was seen and examined.  MRI completed and pending results.  Orthopedic/neuro spine surgery not available at [facility].  I will call neurosurgery on call once MRI is resulted.  10/23: Patient was seen and examined.  MRI resulted and reviewed.  No significant acute findings requiring intervention at this time.  I have discussed this with orthopedic and neuro spine surgery at [facility] who recommend outpatient follow-up with neuro spine surgery.  Patient agrees for discharge to extended-care facility.  PT/OT recommendation.  Currently awaiting preauthorization for placement in an extended care facility.  10/24: Patient was seen and examined.  He  has no new complaints today.  Currently awaiting preauthorization for placement in an extended care facility.        Objective     Last Recorded Vitals  /76 (Patient Position: Sitting)   Pulse 75   Temp 36.7 °C (98.1 °F)   Resp 16   Wt 67.5 kg (148 lb 14.4 oz)   SpO2 90%   Intake/Output last 3 Shifts:    Intake/Output Summary (Last 24 hours) at 10/24/2023 1048  Last data filed at 10/24/2023 0905  Gross per 24 hour   Intake 940 ml   Output 1325 ml   Net -385 ml         Admission Weight  Weight: 55.8 kg (123 lb) (10/20/23 1245)    Daily Weight  10/20/23 : 67.5 kg (148 lb 14.4 oz)    Image Results  MR lumbar spine wo IV contrast  Narrative: Interpreted By:  Prasanth Thakur,   STUDY:  MRI of the lumbar spine without IV contrast;  10/21/2023 9:59 am      INDICATION:  Signs/Symptoms:Spinal stenosis with incontinence of bladder and  bowel.  Kyphoplasty 3 weeks ago in [place]. Unable to ambulate due  to severe pain. Subacute incontinence of bowel and bladder function.      COMPARISON:  10/20/2023 CT and abdominal CT 06/15/2021      ACCESSION NUMBER(S):  [id number]      ORDERING CLINICIAN:  CECI SHAW      TECHNIQUE:  Sagittal and axial STIR and T1-weighted MRI images of the lumbar  spine were acquired using a spondylolysis protocol.  No contrast was  administered.      FINDINGS:  There are 5 lumbar type vertebrae.      The spine curves approximately 30ï¿½ convex to the left at T12-L1 and  to the right at L4-5. (The curvatures were approximately 15-20  degrees in 2021)      L2 has 5 mm retrolisthesis on L3 with 5 mm anterolisthesis L4 on L5  and L5 3 mm on S1, all related to the scoliosis.      Kyphoplasty has been performed at T12.      The marrow has mottled signal intensity on all imaging sequences with  more focal round areas possibly representing atypical hemangiomas in  the central L4 and L5 vertebral bodies. Smaller similar foci are  scattered through the remaining spine as well. Underlying  "marrow  tumors are not excluded; no soft tissue mass extending from the  vertebrae is noted.      The tip of the spinal cord ends normally at L1 with no compression or  intrinsic abnormalities of the spinal cord noted.      The T12 vertebral body has 80% loss of height with bone cement from  kyphoplasty centrally and anteriorly. The bone cement is surrounded  by a band of marrow edema. The posterior wall of the vertebral body  is buckled with less than 20% stenosis of the canal.      The L1 vertebral body has diffuse marrow edema with compression  anteriorly and superiorly with 20-30% loss of height.      The L2 vertebral body has chronic 60-70% loss of height mostly  centrally and anteriorly.      The L3 vertebral body has diffuse marrow edema extending into the  pedicles bilaterally with 20% loss of height superiorly. The  posterior wall of the vertebral body is minimally buckled with no  significant canal stenosis.      The spinous processes are \"kissing\" from L1-2 through L4-5 with  severe degenerative changes at L3-4.      T10-11: No stenosis is noted on the sagittal images.      T11-12: No stenosis is noted.      T12-L1: The right neural foramen is moderately to severely stenosed  by the scoliosis, endplate spurring and facet hypertrophy with mild  to moderate stenosis of the right lateral recess.      L1-2: No stenosis is noted.      L2-3: The lateral recesses are moderately to severely stenosed with  moderate stenosis of the central canal secondary to the scoliosis,  facet hypertrophy, ligament thickening and a retrolisthesis. The  neural foramina are mildly to moderately stenosed worse on the right.      L3-4: The left neural foramen is moderately stenosed secondary to the  scoliosis. The lateral recesses and spinal canal are mildly stenosed  by ligament thickening and disc bulge.      L4-5: The neural foramina are severely stenosed secondary to the  scoliosis, facet hypertrophy and disc bulge. The lateral " recesses and  spinal canal are moderately stenosed.      L5-S1: The lateral recesses are mildly stenosed by facet hypertrophy  and ligament thickening with mild foraminal stenoses secondary to  endplate spurring as well.      The right kidney has simple cysts up to 2.7 cm diameter.          Impression: 1. The marrow has diffuse mottling with more focal areas of  abnormality as described; the findings may be secondary to benign  processes such as atypical hemangiomas and severe osteoporosis.  Underlying marrow tumor cannot be excluded however. No soft tissue  masses suggestive of tumor are noted      2. Multiple compression injuries are present similar to the  10/20/2023 spine CT; these are new from an abdominal CT 06/15/2021.  The scoliosis has worsened as well.      3. The marrow edema at L1 is consistent with acute/subacute  compression injury with similar edema in the L3 vertebral body.      4. Multilevel stenoses as noted with the greatest degrees of stenosis  at L2-3 and L4-5.      I personally reviewed the images/study and I agree with the findings  as stated. This study was interpreted at [facility], [place], [place].      MACRO:  None      Signed by: Prasanth Thakur 10/23/2023 9:17 AM  Dictation workstation:   [id number]      Physical Exam  Constitutional: Chronically ill looking emaciated elderly  male who is conscious alert afebrile anicteric not pale.  Head: Is atraumatic and normal cephalic  Eyes: PERRLA  Skin: Dry normal color for age and race  Chest: Clear to auscultations bilaterally  CVS: S1-S2 no murmurs rubs or gallops  Abdomen: Flat soft moves respiration bowel sounds present nontender  Extremities: No pitting pedal edema bilaterally  Psychiatric: Normal mood and affect  Relevant Results    No results found for this or any previous visit (from the past 24 hour(s)).          Assessment/Plan                  Principal Problem:    Low back pain without  sciatica, unspecified back pain laterality, unspecified chronicity  Active Problems:    Personal history of other diseases of the digestive system    Gait disturbance    #1 spinal stenosis with gait abnormality      Concern is raised for the possibility of spinal stenosis with cord compression.  MRI of the lumbar spine and sacrum completed and pending results.   Patient has had incontinence of bladder and bowel function for the last 3 weeks.  History of having kyphoplasty approximately 3 weeks ago.  Rule out spinal cord hemorrhage.  Do not see evidence of abscess patient does not have any fevers chills or leukocytosis.  Patient with known history of scoliosis and compression fractures with moderate spinal stenosis on previous MRI 3 months ago.  But with the changing of neurological symptoms   MRI completed and pending report  We will discuss with neuro spine surgery on-call after MRI is reported.  Neurologist unavailable    #2 incontinence of bladder and bowel      Concern for cord compression is raised.  This is subacute is been going on for over 3 weeks.  MRI of the lumbar spine to be obtained.     #3 gait disturbance      Patient is to have physical therapy occupational therapy to evaluate.  Patient has decent peripheral strength in the lower extremities but does have proximal weakness right worse than left.  Neurology consultation as above.     #4 history of short-bowel syndrome      Patient reports that he is only has 8 foot of small bowel and has chronic soft stools and diarrhea.  It is making things worse with his incontinence of stool.  Patient has early skin breakdown.      #5 early sacral decubiti      Patient is to have wound care consultation for recommendations besides avoiding soiling contamination of the region and increase mobility.     #6 hypertension       Continue home meds once verified     #7 COPD/reactive airway disease      As needed albuterol continue patient on Advair               Kristina Isaac MD

## 2023-10-24 NOTE — PROGRESS NOTES
Occupational Therapy    OT Treatment    Patient Name: Edwin Stewart  MRN: [id number]  Today's Date: 10/24/2023  Time Calculation  Start Time: 1506  Stop Time: 1529  Time Calculation (min): 23 min         Assessment:  OT Assessment: Pt encouraged to participate in his ADLs as he lives alone  Evaluation/Treatment Tolerance: Patient tolerated treatment well  End of Session Communication: Bedside nurse  End of Session Patient Position: Up in chair, Alarm on  Evaluation/Treatment Tolerance: Patient tolerated treatment well    Plan:  Treatment Interventions: ADL retraining, Functional transfer training, Neuromuscular reeducation  Treatment Interventions: ADL retraining, Functional transfer training, Neuromuscular reeducation  Subjective : Pt states he is soiled and needs to be cleaned up.     Current Problem:  1. Low back pain without sciatica, unspecified back pain laterality, unspecified chronicity            General:  OT Received On: 10/24/23     Pain:  Pain Assessment  Pain Assessment: 0-10  Pain Score:  (Pt c/o of pain and needing tylenol but did notquatify)  Effect of Pain on Daily Activities: decreasedactivity tolerance  Objective      Activities of Daily Living:          LE Bathing  LE Bathing Level of Assistance: Contact guard  LE Bathing Where Assessed: Edge of bed (standing with setup)     LE Dressing  LE Dressing: Yes  Adult Briefs Level of Assistance: Minimum assistance  LE Dressing Where Assessed: Chair       Functional Standing Tolerance:  Time: static standing semi Supported on FWW for 5min (Fair balance)    Bed Mobility/Transfers: Bed Mobility  Bed Mobility: Yes  Bed Mobility 1  Bed Mobility 1: Supine to sitting  Level of Assistance 1: Minimum assistance  Transfers  Transfer: Yes  Transfer 1  Technique 1: Sit to stand, Stand to sit (Three trials)  Transfer Device 1: Walker  Transfer Level of Assistance 1: Minimum assistance             Outcome Measures:[facility] Daily Activity  Putting on and taking off  regular lower body clothing: A little  Bathing (including washing, rinsing, drying): A little  Putting on and taking off regular upper body clothing: A little  Toileting, which includes using toilet, bedpan or urinal: A little  Taking care of personal grooming such as brushing teeth: None  Eating Meals: None  Daily Activity - Total Score: 20    EDUCATION:  Education  Individual(s) Educated: Patient  Education Provided: Diagnosis & Precautions, Symptom management, Fall precautons, Risk and benefits of OT discussed with patient or other, POC discussed and agreed upon  Risk and Benefits Discussed with Patient/Caregiver/Other: yes  Patient/Caregiver Demonstrated Understanding: yes  Plan of Care Discussed and Agreed Upon: yes  Patient Response to Education: Patient/Caregiver Verbalized Understanding of Information    Goals:  Occupational Therapy    Evaluation    Patient Name: Edwin Stewart  MRN: [id number]  Today's Date: 10/24/2023  Time Calculation  Start Time: 1506  Stop Time: 1529  Time Calculation (min): 23 min        Assessment:  OT Assessment: Pt encouraged to participate in his ADLs as he lives alone  Prognosis: Good  Evaluation/Treatment Tolerance: Patient tolerated treatment well  Medical Staff Made Aware: Yes  End of Session Communication: Bedside nurse  End of Session Patient Position: Up in chair, Alarm on  Evaluation/Treatment Tolerance: Patient tolerated treatment well  Plan:  Treatment Interventions: ADL retraining, Functional transfer training, Neuromuscular reeducation  Treatment Interventions: ADL retraining, Functional transfer training, Neuromuscular reeducation    Subjective   Current Problem:  1. Low back pain without sciatica, unspecified back pain laterality, unspecified chronicity          General:     Precautions:        Pain:  Pain Assessment  Pain Assessment: 0-10  Pain Score:  (Pt c/o of pain and needing tylenol but did notquatify)  Effect of Pain on Daily Activities: decreasedactivity  tolerance    Objective   Cognition:              Home Living:     Prior Function:     IADL History:     ADL:     Activity Tolerance:     Bed Mobility/Transfers: Bed Mobility  Bed Mobility: Yes  Bed Mobility 1  Bed Mobility 1: Supine to sitting  Level of Assistance 1: Minimum assistance   and Transfers  Transfer: Yes  Transfer 1  Technique 1: Sit to stand, Stand to sit (Three trials)  Transfer Device 1: Walker  Transfer Level of Assistance 1: Minimum assistance   Strength:         Outcome Measures:[facility] Daily Activity  Putting on and taking off regular lower body clothing: A little  Bathing (including washing, rinsing, drying): A little  Putting on and taking off regular upper body clothing: A little  Toileting, which includes using toilet, bedpan or urinal: A little  Taking care of personal grooming such as brushing teeth: None  Eating Meals: None  Daily Activity - Total Score: 20        Education Documentation  Precautions, taught by LINDA Hernandez at 10/24/2023  3:45 PM.  Learner: Patient  Readiness: Acceptance  Method: Explanation  Response: Verbalizes Understanding    Body Mechanics, taught by LINDA Hernandez at 10/24/2023  3:45 PM.  Learner: Patient  Readiness: Acceptance  Method: Explanation  Response: Verbalizes Understanding    Home Exercise Program, taught by LINDA Hernandez at 10/24/2023  3:45 PM.  Learner: Patient  Readiness: Acceptance  Method: Explanation  Response: Verbalizes Understanding    Mobility Training, taught by LINDA Hernandez at 10/24/2023  3:45 PM.  Learner: Patient  Readiness: Acceptance  Method: Explanation  Response: Verbalizes Understanding    Handouts, taught by LINDA Hernandez at 10/24/2023  3:45 PM.  Learner: Patient  Readiness: Acceptance  Method: Explanation  Response: Verbalizes Understanding    Body Mechanics, taught by LINDA Hernandez at 10/24/2023  3:45 PM.  Learner: Patient  Readiness: Acceptance  Method:  Explanation  Response: Verbalizes Understanding    Precautions, taught by LINDA Hernandez at 10/24/2023  3:45 PM.  Learner: Patient  Readiness: Acceptance  Method: Explanation  Response: Verbalizes Understanding    ADL Training, taught by LINDA Hernandez at 10/24/2023  3:45 PM.  Learner: Patient  Readiness: Acceptance  Method: Explanation  Response: Verbalizes Understanding    Education Comments  No comments found.        OP EDUCATION:  Education  Individual(s) Educated: Patient  Education Provided: Diagnosis & Precautions, Symptom management, Fall precautons, Risk and benefits of OT discussed with patient or other, POC discussed and agreed upon  Risk and Benefits Discussed with Patient/Caregiver/Other: yes  Patient/Caregiver Demonstrated Understanding: yes  Plan of Care Discussed and Agreed Upon: yes  Patient Response to Education: Patient/Caregiver Verbalized Understanding of Information    Goals:  Occupational Therapy    Evaluation    Patient Name: Edwin Stewart  MRN: [id number]  Today's Date: 10/24/2023  Time Calculation  Start Time: 1506  Stop Time: 1529  Time Calculation (min): 23 min        Assessment:  OT Assessment: Pt encouraged to participate in his ADLs as he lives alone  Prognosis: Good  Evaluation/Treatment Tolerance: Patient tolerated treatment well  Medical Staff Made Aware: Yes  End of Session Communication: Bedside nurse  End of Session Patient Position: Up in chair, Alarm on  Evaluation/Treatment Tolerance: Patient tolerated treatment well  Plan:  Treatment Interventions: ADL retraining, Functional transfer training, Neuromuscular reeducation  Treatment Interventions: ADL retraining, Functional transfer training, Neuromuscular reeducation    Subjective   Current Problem:  1. Low back pain without sciatica, unspecified back pain laterality, unspecified chronicity          General:     Precautions:        Pain:  Pain Assessment  Pain Assessment: 0-10  Pain Score:  (Pt c/o of pain and  needing tylenol but did notquatify)  Effect of Pain on Daily Activities: decreasedactivity tolerance    Objective   Cognition:              Home Living:     Prior Function:     IADL History:     ADL:     Activity Tolerance:     Bed Mobility/Transfers: Bed Mobility  Bed Mobility: Yes  Bed Mobility 1  Bed Mobility 1: Supine to sitting  Level of Assistance 1: Minimum assistance   and Transfers  Transfer: Yes  Transfer 1  Technique 1: Sit to stand, Stand to sit (Three trials)  Transfer Device 1: Walker  Transfer Level of Assistance 1: Minimum assistance   Strength:         Outcome Measures:[facility] Daily Activity  Putting on and taking off regular lower body clothing: A little  Bathing (including washing, rinsing, drying): A little  Putting on and taking off regular upper body clothing: A little  Toileting, which includes using toilet, bedpan or urinal: A little  Taking care of personal grooming such as brushing teeth: None  Eating Meals: None  Daily Activity - Total Score: 20        Education Documentation  Precautions, taught by LINDA Hernandez at 10/24/2023  3:45 PM.  Learner: Patient  Readiness: Acceptance  Method: Explanation  Response: Verbalizes Understanding    Body Mechanics, taught by LINDA Hernandez at 10/24/2023  3:45 PM.  Learner: Patient  Readiness: Acceptance  Method: Explanation  Response: Verbalizes Understanding    Home Exercise Program, taught by LINDA Hernandez at 10/24/2023  3:45 PM.  Learner: Patient  Readiness: Acceptance  Method: Explanation  Response: Verbalizes Understanding    Mobility Training, taught by LINDA Hernandez at 10/24/2023  3:45 PM.  Learner: Patient  Readiness: Acceptance  Method: Explanation  Response: Verbalizes Understanding    Handouts, taught by LINDA Hernandez at 10/24/2023  3:45 PM.  Learner: Patient  Readiness: Acceptance  Method: Explanation  Response: Verbalizes Understanding    Body Mechanics, taught by LINDA Hernandez  at 10/24/2023  3:45 PM.  Learner: Patient  Readiness: Acceptance  Method: Explanation  Response: Verbalizes Understanding    Precautions, taught by LINDA Hernandez at 10/24/2023  3:45 PM.  Learner: Patient  Readiness: Acceptance  Method: Explanation  Response: Verbalizes Understanding    ADL Training, taught by LINDA Hernandez at 10/24/2023  3:45 PM.  Learner: Patient  Readiness: Acceptance  Method: Explanation  Response: Verbalizes Understanding    Education Comments  No comments found.        OP EDUCATION:  Education  Individual(s) Educated: Patient  Education Provided: Diagnosis & Precautions, Symptom management, Fall precautons, Risk and benefits of OT discussed with patient or other, POC discussed and agreed upon  Risk and Benefits Discussed with Patient/Caregiver/Other: yes  Patient/Caregiver Demonstrated Understanding: yes  Plan of Care Discussed and Agreed Upon: yes  Patient Response to Education: Patient/Caregiver Verbalized Understanding of Information    Goals:  Occupational Therapy    Evaluation    Patient Name: Edwin Stewart  MRN: [id number]  Today's Date: 10/24/2023  Time Calculation  Start Time: 1506  Stop Time: 1529  Time Calculation (min): 23 min        Assessment:  OT Assessment: Pt encouraged to participate in his ADLs as he lives alone  Prognosis: Good  Evaluation/Treatment Tolerance: Patient tolerated treatment well  Medical Staff Made Aware: Yes  End of Session Communication: Bedside nurse  End of Session Patient Position: Up in chair, Alarm on  Evaluation/Treatment Tolerance: Patient tolerated treatment well  Plan:  Treatment Interventions: ADL retraining, Functional transfer training, Neuromuscular reeducation  Treatment Interventions: ADL retraining, Functional transfer training, Neuromuscular reeducation    Subjective   Current Problem:  1. Low back pain without sciatica, unspecified back pain laterality, unspecified chronicity          General:     Precautions:         Pain:  Pain Assessment  Pain Assessment: 0-10  Pain Score:  (Pt c/o of pain and needing tylenol but did notquatify)  Effect of Pain on Daily Activities: decreasedactivity tolerance    Objective   Cognition:              Home Living:     Prior Function:     IADL History:     ADL:     Activity Tolerance:     Bed Mobility/Transfers: Bed Mobility  Bed Mobility: Yes  Bed Mobility 1  Bed Mobility 1: Supine to sitting  Level of Assistance 1: Minimum assistance   and Transfers  Transfer: Yes  Transfer 1  Technique 1: Sit to stand, Stand to sit (Three trials)  Transfer Device 1: Walker  Transfer Level of Assistance 1: Minimum assistance   Strength:         Outcome Measures:[facility] Daily Activity  Putting on and taking off regular lower body clothing: A little  Bathing (including washing, rinsing, drying): A little  Putting on and taking off regular upper body clothing: A little  Toileting, which includes using toilet, bedpan or urinal: A little  Taking care of personal grooming such as brushing teeth: None  Eating Meals: None  Daily Activity - Total Score: 20        Education Documentation  Precautions, taught by LINDA Hernandez at 10/24/2023  3:45 PM.  Learner: Patient  Readiness: Acceptance  Method: Explanation  Response: Verbalizes Understanding    Body Mechanics, taught by LINDA Hernandez at 10/24/2023  3:45 PM.  Learner: Patient  Readiness: Acceptance  Method: Explanation  Response: Verbalizes Understanding    Home Exercise Program, taught by LINAD Hernandez at 10/24/2023  3:45 PM.  Learner: Patient  Readiness: Acceptance  Method: Explanation  Response: Verbalizes Understanding    Mobility Training, taught by LINDA Hernandez at 10/24/2023  3:45 PM.  Learner: Patient  Readiness: Acceptance  Method: Explanation  Response: Verbalizes Understanding    Handouts, taught by LINDA Hernandez at 10/24/2023  3:45 PM.  Learner: Patient  Readiness: Acceptance  Method:  Explanation  Response: Verbalizes Understanding    Body Mechanics, taught by LINDA Hernandez at 10/24/2023  3:45 PM.  Learner: Patient  Readiness: Acceptance  Method: Explanation  Response: Verbalizes Understanding    Precautions, taught by LINDA Hernandez at 10/24/2023  3:45 PM.  Learner: Patient  Readiness: Acceptance  Method: Explanation  Response: Verbalizes Understanding    ADL Training, taught by LINDA Hernandez at 10/24/2023  3:45 PM.  Learner: Patient  Readiness: Acceptance  Method: Explanation  Response: Verbalizes Understanding    Education Comments  No comments found.        OP EDUCATION:  Education  Individual(s) Educated: Patient  Education Provided: Diagnosis & Precautions, Symptom management, Fall precautons, Risk and benefits of OT discussed with patient or other, POC discussed and agreed upon  Risk and Benefits Discussed with Patient/Caregiver/Other: yes  Patient/Caregiver Demonstrated Understanding: yes  Plan of Care Discussed and Agreed Upon: yes  Patient Response to Education: Patient/Caregiver Verbalized Understanding of Information    Goals:  Encounter Problems       Encounter Problems (Active)       Mobility       STG - Patient will ambulate up to at least 100' with LRD with CGA x1 and minimal complaints of pain (<4/10) and no LOB to increase indep in room/facility (Progressing)       Start:  10/21/23    Expected End:  10/26/23               OT Goals       Pt will complete ADL transfers with CGA using LRAD.  (Progressing)       Start:  10/21/23    Expected End:  10/26/23            Pt will complete bed mobility tasks with MIN A as needed to complete ADL tasks.  (Progressing)       Start:  10/21/23    Expected End:  10/26/23            pt will complete LBD tasks with MIN A using AE/DME as needed.  (Progressing)       Start:  10/21/23    Expected End:  10/26/23            Pt completed toileting routine with MIN A.  (Progressing)       Start:  10/21/23    Expected End:   10/26/23            Pt will demonstrate F+ functional activity tolerance during ADL tasks.  (Progressing)       Start:  10/21/23    Expected End:  10/26/23               Transfers       STG - Patient will perform bed mobility (supine<>sit) with Min A x1 using logrolling technique as able to protect spine to increase indep with getting in/out of bed safely (Progressing)       Start:  10/21/23    Expected End:  10/26/23            STG- Patient will be able to perform STS and SPT transfers with LRD from bed/chair with CGA x1 to increase indep in room (Progressing)       Start:  10/21/23    Expected End:  10/26/23

## 2023-10-24 NOTE — PROGRESS NOTES
Physical Therapy    Physical Therapy Treatment    Patient Name: Edwin Stewart  MRN: [id number]  Today's Date: 10/24/2023  Time Calculation  Start Time: 1503  Stop Time: 1529  Time Calculation (min): 26 min       Assessment/Plan   PT Assessment  PT Assessment Results: Decreased strength, Decreased endurance, Impaired balance, Decreased mobility, Decreased skin integrity  Rehab Prognosis: Fair  Barriers to Discharge: chronic pain, decreased support at home  Evaluation/Treatment Tolerance: Patient limited by pain, Patient limited by fatigue  Medical Staff Made Aware: Yes  Strengths: Attitude of self, Support of extended family/friends  Barriers to Participation: Co morbidities  End of Session Communication: Bedside nurse  Assessment Comment: pt demonstrated good progress towards goals. He  required 1 assist this session with all mobility. pt was educated on safety and technique to decrease fall risk.  End of Session Patient Position: Up in chair, Alarm on     PT Plan  Treatment/Interventions: Bed mobility, Transfer training, Gait training, Balance training, Strengthening, Endurance training, Range of motion, Therapeutic exercise, Therapeutic activity, Home exercise program, Positioning  PT Plan: Skilled PT  PT Frequency: 3 times per week  PT Discharge Recommendations: Moderate intensity level of continued care  PT Recommended Transfer Status: Assist x2      General Visit Information:   PT  Visit  PT Received On: 10/24/23  Response to Previous Treatment: Patient with no complaints from previous session.  General  Reason for Referral: impaired mobility  Referred By: iker  Past Medical History Relevant to Rehab: scoliosis, spinal stenosis, HTN, short bowel syndrome, osteoporosis, asthma, COPD, kyphoplasty, hx of R hip surgery  Patient Position Received: Bed, 2 rail up  Preferred Learning Style: verbal  General Comment: pt agreeable to treatment pt stated he is home alone. pt c/o back pain but did not  rate.    Precautions:  Precautions  Precautions Comment: Fall risk, recent kyphoplasty with hx of chronic backpain and spinal deformities at T12-L3 (spinal precautions for comfort)  Vital Signs:       Objective   Pain:  Pain Assessment  Pain Assessment: 0-10  Pain Score:  (did not rate butc/o LBP. RN notified pt requesting tylenol)  Pain Type: Acute pain  Pain Location: Back  Cognition:              Treatments:  Balance/Neuromuscular Re-Education  Balance/Neuromuscular Re-Education Activity Performed: Yes  Balance/Neuromuscular Re-Education Activity 1: pt stood 3 stands x2 - 5 minutes each at wheeled walker with MIn Ax1 and cues for safety. pt with no LOB and is able to let go of walker for small amounts of time.    Bed Mobility  Bed Mobility: Yes  Bed Mobility 1  Bed Mobility 1: Supine to sitting  Level of Assistance 1: Minimum assistance, Minimal verbal cues  Bed Mobility Comments 1: pt educated on improved technique and use of bed rails to assist.    Ambulation/Gait Training  Ambulation/Gait Training Performed: Yes  Ambulation/Gait Training 1  Surface 1: Level tile  Device 1: Rolling walker  Assistance 1: Minimum assistance  Quality of Gait 1: Narrow base of support  Comments/Distance (ft) 1: pt required cues for safety and technique. pt ambulated 7 feet.  Transfers  Transfer: Yes  Transfer 1  Technique 1: Sit to stand, Stand to sit  Transfer Device 1: Walker  Transfer Level of Assistance 1: Minimal verbal cues, Minimum assistance  Trials/Comments 1: pt performed multiple sit<.stands and required cues for safety and technique,  Transfers 2  Technique 2: Stand pivot  Transfer Device 2: Walker  Transfer Level of Assistance 2: Minimum assistance, Maximum verbal cues  Trials/Comments 2: cues for walker safety and technique.    Outcome Measures:  [facility] Basic Mobility  Turning from your back to your side while in a flat bed without using bedrails: None  Moving from lying on your back to sitting on the side of a flat  bed without using bedrails: A little  Moving to and from bed to chair (including a wheelchair): A lot  Standing up from a chair using your arms (e.g. wheelchair or bedside chair): A little  To walk in hospital room: A lot  Climbing 3-5 steps with railing: Total  Basic Mobility - Total Score: 15    Education Documentation  Precautions, taught by Radha Ward PTA at 10/24/2023  3:34 PM.  Learner: Patient  Readiness: Acceptance  Method: Explanation  Response: Verbalizes Understanding, Needs Reinforcement    Body Mechanics, taught by Radha Ward PTA at 10/24/2023  3:34 PM.  Learner: Patient  Readiness: Acceptance  Method: Explanation  Response: Verbalizes Understanding, Needs Reinforcement    Home Exercise Program, taught by Radha Ward PTA at 10/24/2023  3:34 PM.  Learner: Patient  Readiness: Acceptance  Method: Explanation  Response: Verbalizes Understanding, Needs Reinforcement    Mobility Training, taught by Radha Ward PTA at 10/24/2023  3:34 PM.  Learner: Patient  Readiness: Acceptance  Method: Explanation  Response: Verbalizes Understanding, Needs Reinforcement    Education Comments  No comments found.               Encounter Problems       Encounter Problems (Active)       Mobility       STG - Patient will ambulate up to at least 100' with LRD with CGA x1 and minimal complaints of pain (<4/10) and no LOB to increase indep in room/facility (Progressing)       Start:  10/21/23    Expected End:  10/26/23               Transfers       STG - Patient will perform bed mobility (supine<>sit) with Min A x1 using logrolling technique as able to protect spine to increase indep with getting in/out of bed safely (Progressing)       Start:  10/21/23    Expected End:  10/26/23            STG- Patient will be able to perform STS and SPT transfers with LRD from bed/chair with CGA x1 to increase indep in room (Progressing)       Start:  10/21/23    Expected End:  10/26/23

## 2023-10-25 VITALS
HEART RATE: 85 BPM | HEIGHT: 60 IN | DIASTOLIC BLOOD PRESSURE: 67 MMHG | BODY MASS INDEX: 29.23 KG/M2 | WEIGHT: 148.9 LBS | RESPIRATION RATE: 16 BRPM | OXYGEN SATURATION: 95 % | SYSTOLIC BLOOD PRESSURE: 128 MMHG | TEMPERATURE: 98.3 F

## 2023-10-25 PROCEDURE — 2500000001 HC RX 250 WO HCPCS SELF ADMINISTERED DRUGS (ALT 637 FOR MEDICARE OP): Performed by: INTERNAL MEDICINE

## 2023-10-25 PROCEDURE — 99239 HOSP IP/OBS DSCHRG MGMT >30: CPT | Performed by: INTERNAL MEDICINE

## 2023-10-25 PROCEDURE — 2500000005 HC RX 250 GENERAL PHARMACY W/O HCPCS: Performed by: STUDENT IN AN ORGANIZED HEALTH CARE EDUCATION/TRAINING PROGRAM

## 2023-10-25 RX ORDER — GABAPENTIN 100 MG/1
100 CAPSULE ORAL 2 TIMES DAILY
Qty: 60 CAPSULE | Refills: 0 | Status: SHIPPED | OUTPATIENT
Start: 2023-10-25 | End: 2023-11-24

## 2023-10-25 RX ORDER — LIDOCAINE 560 MG/1
1 PATCH PERCUTANEOUS; TOPICAL; TRANSDERMAL DAILY
Qty: 14 PATCH | Refills: 0 | Status: SHIPPED | OUTPATIENT
Start: 2023-10-26 | End: 2023-11-09

## 2023-10-25 RX ORDER — GUAIFENESIN/DEXTROMETHORPHAN 100-10MG/5
5 SYRUP ORAL EVERY 4 HOURS PRN
Qty: 118 ML | Refills: 0 | Status: SHIPPED | OUTPATIENT
Start: 2023-10-25

## 2023-10-25 RX ORDER — LOPERAMIDE HYDROCHLORIDE 2 MG/1
2 CAPSULE ORAL 4 TIMES DAILY PRN
Status: DISCONTINUED | OUTPATIENT
Start: 2023-10-25 | End: 2023-10-25 | Stop reason: HOSPADM

## 2023-10-25 RX ORDER — LOPERAMIDE HYDROCHLORIDE 2 MG/1
2 CAPSULE ORAL 4 TIMES DAILY PRN
Qty: 30 CAPSULE | Refills: 0 | Status: SHIPPED | OUTPATIENT
Start: 2023-10-25

## 2023-10-25 RX ADMIN — LOSARTAN POTASSIUM 50 MG: 50 TABLET, FILM COATED ORAL at 08:15

## 2023-10-25 RX ADMIN — LIDOCAINE 1 PATCH: 4 PATCH TOPICAL at 08:15

## 2023-10-25 RX ADMIN — ASPIRIN 81 MG: 81 TABLET, COATED ORAL at 08:15

## 2023-10-25 RX ADMIN — GABAPENTIN 100 MG: 100 CAPSULE ORAL at 08:15

## 2023-10-25 RX ADMIN — LEVOTHYROXINE SODIUM 75 MCG: 0.07 TABLET ORAL at 06:15

## 2023-10-25 ASSESSMENT — COGNITIVE AND FUNCTIONAL STATUS - GENERAL
MOBILITY SCORE: 21
DAILY ACTIVITIY SCORE: 23
STANDING UP FROM CHAIR USING ARMS: A LITTLE
CLIMB 3 TO 5 STEPS WITH RAILING: A LITTLE
WALKING IN HOSPITAL ROOM: A LITTLE
TOILETING: A LITTLE

## 2023-10-25 ASSESSMENT — PAIN SCALES - GENERAL: PAINLEVEL_OUTOF10: 0 - NO PAIN

## 2023-10-25 NOTE — NURSING NOTE
Order received for discharge to SNF. Report called to staff at [facility]. [facility] d/cd. Waiting on PAS to transport pt.

## 2023-10-25 NOTE — CARE PLAN
The clinical goals for the shift include pt remain pain free      Problem: Pain  Goal: Takes deep breaths with improved pain control throughout the shift  Outcome: Progressing  Goal: Turns in bed with improved pain control throughout the shift  Outcome: Progressing  Goal: Walks with improved pain control throughout the shift  Outcome: Progressing  Goal: Performs ADL's with improved pain control throughout shift  Outcome: Progressing  Goal: Participates in PT with improved pain control throughout the shift  Outcome: Progressing  Goal: Free from opioid side effects throughout the shift  Outcome: Progressing  Goal: Free from acute confusion related to pain meds throughout the shift  Outcome: Progressing     Problem: Skin  Goal: Decreased wound size/increased tissue granulation at next dressing change  Outcome: Progressing  Goal: Participates in plan/prevention/treatment measures  Outcome: Progressing  Goal: Prevent/manage excess moisture  Outcome: Progressing  Goal: Prevent/minimize sheer/friction injuries  Outcome: Progressing  Goal: Promote/optimize nutrition  Outcome: Progressing  Goal: Promote skin healing  Outcome: Progressing     Problem: Nutrition  Goal: Oral intake greater 75%  Outcome: Progressing  Goal: Lab values WNL  Outcome: Progressing  Goal: Maintain stable weight  Outcome: Progressing     Problem: Safety - Adult  Goal: Free from fall injury  Outcome: Progressing

## 2023-10-25 NOTE — DISCHARGE SUMMARY
Discharge Diagnosis  Low back pain without sciatica, unspecified back pain laterality, unspecified chronicity  Incontinence of bladder and bowel  Gait disturbance  History of short-bowel syndrome  Early sacral decubiti  Hypertension  COPD  Issues Requiring Follow-Up      Discharge Meds     Your medication list        START taking these medications        Instructions Last Dose Given Next Dose Due   benzocaine-menthol 15-3.6 mg lozenge  Commonly known as: Cepastat Sore Throat      Dissolve 1 lozenge in the mouth every 2 hours if needed for sore throat for up to 5 days.       dextromethorphan-guaifenesin  mg/5 mL oral liquid  Commonly known as: Robitussin DM      Take 5 mL by mouth every 4 hours if needed for cough.       gabapentin 100 mg capsule  Commonly known as: Neurontin      Take 1 capsule (100 mg) by mouth 2 times a day.       lidocaine 4 % patch  Start taking on: October 26, 2023      Place 1 patch over 12 hours on the skin once daily for 14 days. Remove & discard patch within 12 hours or as directed by MD. Do not start before October 26, 2023.              CONTINUE taking these medications        Instructions Last Dose Given Next Dose Due   aspirin 81 mg EC tablet           fluticasone propion-salmeteroL 100-50 mcg/dose diskus inhaler  Commonly known as: Advair Diskus           Gemtesa 75 mg tablet  Generic drug: vibegron           latanoprost 0.005 % ophthalmic solution  Commonly known as: Xalatan           levothyroxine 75 mcg tablet  Commonly known as: Synthroid, Levoxyl           losartan 50 mg tablet  Commonly known as: Cozaar           montelukast 10 mg tablet  Commonly known as: Singulair           rosuvastatin 5 mg tablet  Commonly known as: Crestor                     Where to Get Your Medications        These medications were sent to [facility][id number] - [address] AT [facility] [facility]  [address] [facility] [address]      Phone: [phone]    benzocaine-menthol 15-3.6 mg lozenge  dextromethorphan-guaifenesin  mg/5 mL oral liquid  gabapentin 100 mg capsule  lidocaine 4 % patch         Test Results Pending At Discharge  Pending Labs       No current pending labs.            Hospital Course   Edwin Stewart is a 78 y.o. male with past medical history of scoliosis, spinal stenosis, hypertension, short-bowel syndrome, osteoporosis, asthma/COPD presenting with acute worsening of chronic back pain and weakness.  Patient states over the past 3 months he has had chronic back pain that has been attributed to scoliosis.  Patient had a kyphoplasty 3 weeks ago in [place].  He states that each and every morning his back is worse and now it is at a point where he is unable to ambulate due to severe pain in the low back.  He denies pain radiating down the legs but states that he has weakness with ambulation worsened on the right hip then the left but states he had chronic right hip pains before from traumatic injury.  Patient states he is having loss of bladder and bowel function over the past 3 weeks as well.  He has developed some bedsores that he is treating with topical triple antibiotic ointment and Vaseline.  He has been seeing home physical therapy/Occupational Therapy without improvement.  In the emergency room patient was complaining about increasing low back pain.  ER physician has given him Toradol and up to 12 mg now of morphine without any significant improvement in his back pain and patient now states that he is dizzy and unable to ambulate because he is dizzy and afraid of falling.  Patient attributes to the dizziness and gait instability to the morphine that he received.  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  Significant L2-3 and L4-5 spinal stenosis related to disc bulging  ligamentous hypertrophy and facet arthropathy left L2 neuroforaminal narrowing related to vertebral body hypertrophy and facet arthropathy.  CBC is unremarkable.  BMP significant for sodium of 130.  C-reactive protein is 2.26 sed rate of 58.  Given the patient has subacute incontinence of bowel and bladder function with lower leg weakness concern for spinal stenosis or cauda equina syndrome is raised.  CT showed spinal stenosis.  We will obtain a MRI to make certain that we do not have any type of hematoma formation from patient's kyphoplasty 3 weeks ago.  Patient does not have any fevers or chills or nothing to suspect a spinal abscess.  Neuro consultation as well as orthopedics consultation will be obtained.  MRI to be repeated (last MRI was 3 months ago and did not show significant stenoses).  Patient is to have physical therapy occupational therapy and further evaluation.  Of note patient has early decubiti and changes.       10/21: Patient was sitting on the chair at the bedside when I saw her today.  Pain is now fairly well controlled.  Incontinence of urine fecal matter has been on for years according to him he was seeing a urologist for the urinary incontinence.  He has had an extensive history of scoliosis and had a kyphosis in the recent 3 to 4 weeks.  MRI was done and is still pending report.  Orthopedic surgery has yet to see the patient.     10/22: Patient was seen and examined.  MRI completed and pending results.  Orthopedic/neuro spine surgery not available at [facility].  I will call neurosurgery on call once MRI is resulted.  10/23: Patient was seen and examined.  MRI resulted and reviewed.  No significant acute findings requiring intervention at this time.  I have discussed this with orthopedic and neuro spine surgery at [facility] who recommend outpatient follow-up with neuro spine surgery.  Patient agrees for discharge to extended-care facility.  PT/OT recommendation.  Currently awaiting  preauthorization for placement in an extended care facility.  10/24: Patient was seen and examined.  He has no new complaints today.  Currently awaiting preauthorization for placement in an extended care facility.  10/25: Patient was discharged in stable condition to an extended care facility for further management.      Discharge process took about 35 minutes    Pertinent Physical Exam At Time of Discharge  Physical Exam  Constitutional: Chronically ill looking emaciated elderly  male who is conscious alert afebrile anicteric not pale.  Head: Is atraumatic and normal cephalic  Eyes: PERRLA  Skin: Dry normal color for age and race  Chest: Clear to auscultations bilaterally  CVS: S1-S2 no murmurs rubs or gallops  Abdomen: Flat soft moves respiration bowel sounds present nontender  Extremities: No pitting pedal edema bilaterally  Psychiatric: Normal mood and affect  Outpatient Follow-Up  Future Appointments   Date Time Provider Department Center   9/26/2024  1:15 PM Edwin Pollard MD [facility]         Kristina Isaac MD

## 2023-10-26 NOTE — DOCUMENTATION CLARIFICATION NOTE
PATIENT:               CECI BAZZI  ACCT #:                  [id number]  MRN:                       [id number]  :                       1945  ADMIT DATE:       10/20/2023 12:44 PM  DISCH DATE:        10/25/2023 1:37 PM  RESPONDING PROVIDER #:        [id number]          PROVIDER RESPONSE TEXT:    hyPONATREMIA    CDI QUERY TEXT:    [facility]_Abnormal Studies    Instruction:    Based on your assessment of the patient and the clinical information, please provide the requested documentation by clicking on the appropriate radio button and enter any additional information if prompted.    Question: Is there a diagnosis indicative of the lab values    When answering this query, please exercise your independent professional judgment. The fact that a question is being asked, does not imply that any particular answer is desired or expected.    The patient's clinical indicators include:  Clinical Information: Patient admitted with spinal stenosis with noted short bowel syndrome    Clinical Indicators:  10/20/23 13:49  SODIUM: 130    10/21/23 04:49  SODIUM: 131    10/22/23 05:51  SODIUM: 132    Treatment: IVF NS@75cc/hr, monitoring sodium levels    Risk Factors: 78yom admitted with spinal stenosis with noted short bowel syndrome  Options provided:  -- Respond - Create new note now  -- Refer to Clinical Documentation Reviewer    Query created by: Dalia Parsons on 10/23/2023 11:04 AM      Electronically signed by:  KOKO THAYER MD 10/26/2023 10:52 AM

## 2024-03-18 ENCOUNTER — OFFICE VISIT (OUTPATIENT)
Dept: UROLOGY | Facility: CLINIC | Age: 79
End: 2024-03-18
Payer: MEDICARE

## 2024-03-18 DIAGNOSIS — N40.1 BENIGN PROSTATIC HYPERPLASIA WITH LOWER URINARY TRACT SYMPTOMS, SYMPTOM DETAILS UNSPECIFIED: Primary | ICD-10-CM

## 2024-03-18 PROCEDURE — 99213 OFFICE O/P EST LOW 20 MIN: CPT | Performed by: UROLOGY

## 2024-03-18 PROCEDURE — 1159F MED LIST DOCD IN RCRD: CPT | Performed by: UROLOGY

## 2024-03-18 PROCEDURE — 1036F TOBACCO NON-USER: CPT | Performed by: UROLOGY

## 2024-03-18 NOTE — PROGRESS NOTES
"03/18/2024  Voiding well on Gemtesa, nocturia 0    Patient has no nausea, no vomiting, no fever.    DEJA: Deferred    PSA: No more    We discussed benign prostate hypertrophy, unstable bladder, continue Gemtesa  We discussed no more PSA check due to the age  All the questions were answered, the patient expressed understanding and agreed to the plan.    Impression  BPH  Unstable bladder  Dysuria\    Plan  Continue Gemtesa 75 mg daily  Follow-up with PCP yearly  Call if problem      Last saw Dr. Sinclair  Chief Complaint   Patient presents with    Benign Prostatic Hypertrophy     Patient here for 6 month check.     Urinary Urgency     Patient experienced a significant improvement with Gemtesa 75 mg. He needs refill of samples.         Physical Exam     TODAYS LAB RESULTS:    No results found for: \"PSA\"    No urine sample.    ASSESSMENT&PLAN:      IMPRESSIONS:         9/26/23 Dr. Sinclair  1. BPH with LUTS  s/p TUIP 9/23/22  Symptoms stable      2. OAB  on vibegron 75 mg QD  Gemtesa - 90% improvement.  Expensive but happy  Has to dehydrate himself (short gut) prior to leaving      3/21/23  1. BPH with LUTS  s/p TUIP 9/23/22  IPSS = 6; QoL = 3 (mixed)      2. OAB  on vibegron 75 mg QD  Gemtesa - 90% improvement.  C/o still having some urinary urgency, fecal urgency, urge incontinence - states he makes it to the bathroom 97% of the time - \"within 100 steps\".  He has recently started having shooting pain at tip of penis.  Denies dysuria.  PVR = 12 ml  Here to discuss 3rd line therapies         12/21/22  1. BPH with LUTS  s/p TUIP 9/23/22  Urgency is better  UUI is resolved     2. OAB  refractory to solifenacin   now s/p vibegron trial   doing relatively well - urgency is better  has to drink a lot of fluids due to short gut        11/9/22  s/p TUIP 9/23/22  Urgency is better  UUI is resolved  PVR 17 ml  Has stopped finasteride and saw palmetto      2. OAB  refractory to solifenacin   still urgency and frequency    " "  7/14/22  here today for cysto/TRUS     7/6/22  Here to discuss UDS  Urodynamic Evaluation  Date of procedure: 7/6/22  Straight catheterization for Postvoid Residual: 75 ml  Uroflow Study:  Amount Voided: 253 mL  Time to Max Flow: 18.6 sec  Maximum Flow Rate: 9.3 mL/sec  Average Flow Rate: 4.7 mL/sec  CMG with Voiding Pressure Study with intraabdominal probe:  First Sensation: 152 mL  First Urge: 168 mL  Capacity: 324 mL  BOOI = 57.2 - 18.6 = 38.6 - borderline for obstruction  Interpretation:   Bladder outlet obstruction  VLPP was: NO leak  Leaking with Valsalva/coughing? no  UPP was not measured  Surface EMG Normal? yes     Symptoms are essentially the same on maximal medical therapy     4/27/22  Established  1. BPH with LUTS  IPSS = 20  QoL = 5  PVR = 226 ml  Continues on Solifenacin, finasteride, alfuzosin. He does complain of some dizziness and lightheadedness as adverse effect of alpha-blocker or anticholinergic. He would prefer to be off of these medications.  No issues with constipation     2. Urge urinary incontinence   Does wear a pad and brief, which he has to change 1-2 times daily     3. PSA screening  PSA 4/2022 0.06     Hospitalized for SBO 12/2022 requiring multiple OR trips/washouts         Last visit [facility] 10/2021:  \"Impression: BPH with obstruction     Urge urinary incontinence     Urinary frequency     Plan: I believe the patient's urinary frequency urgency and occasional urge incontinence is secondary to his large fluid intake he states that he has to drink this much fluid because of a gastrointestinal disorder which requires him to be on a low residue diet with increased fluids.     The patient is to continue on alfuzosin ER 10 mg 1 tablet daily finasteride 5 mg 1 tablet daily and Vesicare 5 mg daily he states that this is improved his episodes of urinary frequency and urgency by 30%     Repeat PSA in April 2022     Return to the office in 6 months for follow-up.     7/30/2019 Pepper Pro CNP    " "  73 year old male patient here today for follow up on Myrbetriq 25 mg daily. Urine culture and cytology from 5/10/19 negative. He states it is only helping 30% He is still leaking all of the time. He states he has to \"dehydrate himself\" in order to leave his house due to his incontinence. He has to wear depends. PVR 90 ml. Discussed cystoscopy and UDS. All the questions were answered, the patient expressed understanding and agreed to the plan.     Impression   BPH with obstruction   Urinary frequency   Urge incontinence      Plan   Cystoscopy   UDS   PSA April 2020 5/7/2019 Pepper Pro CNP      73 year old male patient here today for one month follow up. Started on Myrbetriq 25 mg daily for urinary urgency and frequency. He states that he was having diarrhea after starting it but was also being treated with Flagyl simultaneously for a \"small bowel bacteria\". He is noticing a small improvement of his symptoms. He states that he is having less frequency, urgency and urinary leakage. However, symptoms still persist. He would like to continue the medication.  ml. - did not urinate prior, patient is unable. Discussed continuing Myrbetriq 25 mg daily and follow up in one year. Patient instructed to call in one month with progress of symptoms. All the questions were answered, the patient expressed understanding and agreed to the plan.     Impression   BPH with obstruction   Urinary frequency and urgency     Plan   Start Myrbetriq 25 mg daily   Follow up in one month   PSA in one year   Urine culture   Urine cytology      4/8/2019 Pepper Pro CNP      73 year old male patient here today for year check. PSA on 4/5/19 was 0.36. 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 He cannot tolerate Flomax or alfuzosin because of severe side effects of hypotension and dizziness. The patient has been taking Proscar and saw palmetto he has noticed a slight " improvement in his obstructive urinary symptoms. He has a slow urinary stream occasional incomplete bladder emptying and he is not interested in having any surgical procedures to relieve his obstructive uropathy. He continues to complain of urinary frequency, urgency and nocturia sometimes every hour. DEJA 1+ Prostate is smooth, symmetric, with no nodules or induration. Will start trial of Myrbetriq 25 mg daily. Discussed risks, benefits and alternatives. Patient is asked to follow up in one month. All the questions were answered, the patient expressed understanding and agreed to the plan.     Impression   BPH with obstruction   Urinary frequency and urgency     Plan   Start Myrbetriq 25 mg daily   Follow up in one month   PSA in one year      PSA  4-20/22 0.06  4/7/21 0.58  4/6/20 0.37  4/3/17 1.26  4/8/18 0.52  4/5/19 0.36

## 2024-09-26 ENCOUNTER — APPOINTMENT (OUTPATIENT)
Dept: UROLOGY | Facility: CLINIC | Age: 79
End: 2024-09-26
Payer: MEDICARE

## (undated) DEVICE — 3M™ IOBAN™ 2 ANTIMICROBIAL INCISE DRAPE 6650EZ: Brand: IOBAN™ 2

## (undated) DEVICE — MEDIA CONTRAST ISOVUE 300 100ML

## (undated) DEVICE — GLOVE SURG 8.5 PF POLYMER WHT STRL SIGN LTX ESSENTIAL LTX

## (undated) DEVICE — GOWN,SIRUS,FABRNF,XL,20/CS: Brand: MEDLINE

## (undated) DEVICE — BONE CEMENT C01B HV-R WITH MIXER  US: Brand: KYPHON® HV-R® BONE CEMENT AND KYPHON® MIXER PACK

## (undated) DEVICE — SYRINGE A08E KIS INFLATION HP: Brand: KYPHON®  INFLATION SYRINGE

## (undated) DEVICE — TAMP K08A XPAN INFLAT BONE  SIZE 20/3-RB: Brand: KYPHON XPANDER™ INFLATABLE BONE TAMP

## (undated) DEVICE — JACKSON TABLE POSITIONER KIT: Brand: MEDLINE INDUSTRIES, INC.

## (undated) DEVICE — HYPODERMIC SAFETY NEEDLE: Brand: MAGELLAN

## (undated) DEVICE — LIQUIBAND RAPID ADHESIVE 36/CS 0.8ML: Brand: MEDLINE

## (undated) DEVICE — GLOVE ORANGE PI 8   MSG9080

## (undated) DEVICE — SYRINGE 20ML LL S/C 50

## (undated) DEVICE — BONE FILLER DEVICE F04B SIZE 3

## (undated) DEVICE — GAUZE,SPONGE,4"X4",16PLY,XRAY,STRL,LF: Brand: MEDLINE

## (undated) DEVICE — INTRODUCER T15K ONE STEP OID BEVEL: Brand: KYPHON® ONE-STEP™ OSTEO INTRODUCER™ SYSTEM

## (undated) DEVICE — BONE BIOPSY DEVICE F05A BBD SIZE 3: Brand: MEDTRONIC REUSABLE INSTRUMENTS